# Patient Record
Sex: FEMALE | Race: BLACK OR AFRICAN AMERICAN | NOT HISPANIC OR LATINO | Employment: UNEMPLOYED | URBAN - METROPOLITAN AREA
[De-identification: names, ages, dates, MRNs, and addresses within clinical notes are randomized per-mention and may not be internally consistent; named-entity substitution may affect disease eponyms.]

---

## 2022-09-23 ENCOUNTER — HOSPITAL ENCOUNTER (OUTPATIENT)
Dept: GENERAL RADIOLOGY | Facility: HOSPITAL | Age: 52
Discharge: HOME | End: 2022-09-23
Admitting: NURSE PRACTITIONER

## 2022-09-23 ENCOUNTER — TRANSCRIBE ORDERS (OUTPATIENT)
Dept: ADMINISTRATIVE | Facility: HOSPITAL | Age: 52
End: 2022-09-23

## 2022-09-23 DIAGNOSIS — M79.604 RIGHT LEG PAIN: Primary | ICD-10-CM

## 2022-09-23 DIAGNOSIS — M79.604 RIGHT LEG PAIN: ICD-10-CM

## 2022-09-23 PROCEDURE — 72110 X-RAY EXAM L-2 SPINE 4/>VWS: CPT

## 2022-12-06 ENCOUNTER — TRANSCRIBE ORDERS (OUTPATIENT)
Dept: ADMINISTRATIVE | Facility: HOSPITAL | Age: 52
End: 2022-12-06

## 2022-12-06 DIAGNOSIS — M47.896 OTHER SPONDYLOSIS, LUMBAR REGION: Primary | ICD-10-CM

## 2023-01-03 ENCOUNTER — APPOINTMENT (OUTPATIENT)
Dept: MRI IMAGING | Facility: HOSPITAL | Age: 53
End: 2023-01-03

## 2023-01-06 ENCOUNTER — APPOINTMENT (OUTPATIENT)
Dept: MRI IMAGING | Facility: HOSPITAL | Age: 53
End: 2023-01-06
Payer: MEDICARE

## 2023-02-01 ENCOUNTER — HOSPITAL ENCOUNTER (OUTPATIENT)
Dept: MRI IMAGING | Facility: HOSPITAL | Age: 53
Discharge: HOME | End: 2023-02-01
Admitting: NURSE PRACTITIONER
Payer: MEDICARE

## 2023-02-01 DIAGNOSIS — M47.896 OTHER SPONDYLOSIS, LUMBAR REGION: ICD-10-CM

## 2023-02-01 PROCEDURE — 72148 MRI LUMBAR SPINE W/O DYE: CPT

## 2023-08-02 ENCOUNTER — APPOINTMENT (OUTPATIENT)
Dept: GENERAL RADIOLOGY | Facility: HOSPITAL | Age: 53
End: 2023-08-02
Payer: MEDICARE

## 2023-08-02 ENCOUNTER — HOSPITAL ENCOUNTER (EMERGENCY)
Facility: HOSPITAL | Age: 53
Discharge: HOME | End: 2023-08-02
Attending: EMERGENCY MEDICINE | Admitting: EMERGENCY MEDICINE
Payer: MEDICARE

## 2023-08-02 VITALS
HEART RATE: 79 BPM | TEMPERATURE: 98.7 F | DIASTOLIC BLOOD PRESSURE: 82 MMHG | BODY MASS INDEX: 39.19 KG/M2 | RESPIRATION RATE: 18 BRPM | WEIGHT: 235.23 LBS | SYSTOLIC BLOOD PRESSURE: 136 MMHG | OXYGEN SATURATION: 100 % | HEIGHT: 65 IN

## 2023-08-02 DIAGNOSIS — K21.9 GASTROESOPHAGEAL REFLUX DISEASE, UNSPECIFIED WHETHER ESOPHAGITIS PRESENT: ICD-10-CM

## 2023-08-02 DIAGNOSIS — R07.9 CHEST PAIN, UNSPECIFIED TYPE: Primary | ICD-10-CM

## 2023-08-02 LAB
ALBUMIN SERPL-MCNC: 3.7 G/DL (ref 3.5–5.2)
ALBUMIN/GLOB SERPL: 1.2 G/DL
ALP SERPL-CCNC: 75 U/L (ref 39–117)
ALT SERPL W P-5'-P-CCNC: 19 U/L (ref 1–33)
ANION GAP SERPL CALCULATED.3IONS-SCNC: 9 MMOL/L (ref 5–15)
AST SERPL-CCNC: 13 U/L (ref 1–32)
BASOPHILS # BLD AUTO: 0.1 10*3/MM3 (ref 0–0.2)
BASOPHILS NFR BLD AUTO: 1.1 % (ref 0–1.5)
BILIRUB SERPL-MCNC: 0.4 MG/DL (ref 0–1.2)
BUN SERPL-MCNC: 15 MG/DL (ref 6–20)
BUN/CREAT SERPL: 18.5 (ref 7–25)
CALCIUM SPEC-SCNC: 8.7 MG/DL (ref 8.6–10.5)
CHLORIDE SERPL-SCNC: 103 MMOL/L (ref 98–107)
CO2 SERPL-SCNC: 30 MMOL/L (ref 22–29)
CREAT SERPL-MCNC: 0.81 MG/DL (ref 0.57–1)
DEPRECATED RDW RBC AUTO: 38.5 FL (ref 37–54)
EGFRCR SERPLBLD CKD-EPI 2021: 86.9 ML/MIN/1.73
EOSINOPHIL # BLD AUTO: 0.1 10*3/MM3 (ref 0–0.4)
EOSINOPHIL NFR BLD AUTO: 2.5 % (ref 0.3–6.2)
ERYTHROCYTE [DISTWIDTH] IN BLOOD BY AUTOMATED COUNT: 15.9 % (ref 12.3–15.4)
GEN 5 2HR TROPONIN T REFLEX: <6 NG/L
GLOBULIN UR ELPH-MCNC: 3 GM/DL
GLUCOSE SERPL-MCNC: 101 MG/DL (ref 65–99)
HCT VFR BLD AUTO: 36.7 % (ref 34–46.6)
HGB BLD-MCNC: 11.7 G/DL (ref 12–15.9)
HOLD SPECIMEN: NORMAL
LIPASE SERPL-CCNC: 41 U/L (ref 13–60)
LYMPHOCYTES # BLD AUTO: 1.8 10*3/MM3 (ref 0.7–3.1)
LYMPHOCYTES NFR BLD AUTO: 37.8 % (ref 19.6–45.3)
MCH RBC QN AUTO: 22.1 PG (ref 26.6–33)
MCHC RBC AUTO-ENTMCNC: 31.8 G/DL (ref 31.5–35.7)
MCV RBC AUTO: 69.5 FL (ref 79–97)
MONOCYTES # BLD AUTO: 0.3 10*3/MM3 (ref 0.1–0.9)
MONOCYTES NFR BLD AUTO: 7.1 % (ref 5–12)
NEUTROPHILS NFR BLD AUTO: 2.5 10*3/MM3 (ref 1.7–7)
NEUTROPHILS NFR BLD AUTO: 51.5 % (ref 42.7–76)
NRBC BLD AUTO-RTO: 0.1 /100 WBC (ref 0–0.2)
NT-PROBNP SERPL-MCNC: <36 PG/ML (ref 0–900)
PLATELET # BLD AUTO: 334 10*3/MM3 (ref 140–450)
PMV BLD AUTO: 7.9 FL (ref 6–12)
POTASSIUM SERPL-SCNC: 3.5 MMOL/L (ref 3.5–5.2)
PROT SERPL-MCNC: 6.7 G/DL (ref 6–8.5)
RBC # BLD AUTO: 5.29 10*6/MM3 (ref 3.77–5.28)
SODIUM SERPL-SCNC: 142 MMOL/L (ref 136–145)
TROPONIN T DELTA: NORMAL
TROPONIN T SERPL HS-MCNC: <6 NG/L
WBC NRBC COR # BLD: 4.9 10*3/MM3 (ref 3.4–10.8)
WHOLE BLOOD HOLD SPECIMEN: NORMAL

## 2023-08-02 PROCEDURE — 84484 ASSAY OF TROPONIN QUANT: CPT | Performed by: PHYSICIAN ASSISTANT

## 2023-08-02 PROCEDURE — 71045 X-RAY EXAM CHEST 1 VIEW: CPT

## 2023-08-02 PROCEDURE — 85025 COMPLETE CBC W/AUTO DIFF WBC: CPT | Performed by: PHYSICIAN ASSISTANT

## 2023-08-02 PROCEDURE — 99284 EMERGENCY DEPT VISIT MOD MDM: CPT

## 2023-08-02 PROCEDURE — 80053 COMPREHEN METABOLIC PANEL: CPT | Performed by: PHYSICIAN ASSISTANT

## 2023-08-02 PROCEDURE — 83880 ASSAY OF NATRIURETIC PEPTIDE: CPT | Performed by: PHYSICIAN ASSISTANT

## 2023-08-02 PROCEDURE — 36415 COLL VENOUS BLD VENIPUNCTURE: CPT

## 2023-08-02 PROCEDURE — 93005 ELECTROCARDIOGRAM TRACING: CPT

## 2023-08-02 PROCEDURE — 83690 ASSAY OF LIPASE: CPT | Performed by: PHYSICIAN ASSISTANT

## 2023-08-02 RX ORDER — MECOBALAMIN 5000 MCG
30 TABLET,DISINTEGRATING ORAL DAILY
Qty: 60 CAPSULE | Refills: 0 | Status: SHIPPED | OUTPATIENT
Start: 2023-08-02 | End: 2023-09-01

## 2023-08-02 RX ORDER — SODIUM CHLORIDE 0.9 % (FLUSH) 0.9 %
10 SYRINGE (ML) INJECTION AS NEEDED
Status: DISCONTINUED | OUTPATIENT
Start: 2023-08-02 | End: 2023-08-02 | Stop reason: HOSPADM

## 2023-08-02 RX ORDER — ASPIRIN 81 MG/1
324 TABLET, CHEWABLE ORAL ONCE
Status: COMPLETED | OUTPATIENT
Start: 2023-08-02 | End: 2023-08-02

## 2023-08-02 RX ADMIN — ALUMINUM HYDROXIDE, MAGNESIUM HYDROXIDE, AND DIMETHICONE: 400; 400; 40 SUSPENSION ORAL at 16:52

## 2023-08-02 RX ADMIN — ASPIRIN 81 MG CHEWABLE TABLET 324 MG: 81 TABLET CHEWABLE at 15:51

## 2023-08-02 NOTE — DISCHARGE INSTRUCTIONS
Push clear liquid    See gastroenterology for further work-up if symptoms are persistent    Use Pepcid 20 mg twice daily-over-the-counter    See cardiologist if chest pain continues

## 2023-08-02 NOTE — Clinical Note
[facility] EMERGENCY DEPARTMENT  [address] [facility] [facility] [place] [address]  Phone: [phone]    Kimberly Dawson was seen and treated in our emergency department on 8/2/2023.  She may return to work on 08/04/2023.         Thank you for choosing [facility].    Narda Peters RN

## 2023-08-02 NOTE — ED PROVIDER NOTES
Subjective   History of Present Illness  Chief Complaint: Chest pain    Patient is a 52-year-old -American female with history of acid reflux presents to the ER with complaints of chest pain for 1 week.  Patient states the pain was intermittent and now became more constant.  She describes as a pressure in the middle of her chest that does not radiate.  She also reports some pain in her throat.  No shortness of breath cough or congestion.  No abdominal pain, nausea vomiting or diarrhea.  No lower extremity swelling.  No personal heart history.  Patient states that she was evaluated by virtual provider and started on azithromycin and prednisone previously for cough and feelings of congestion but she states her symptoms have not improved.    PCP: Titi Nance    History provided by:  Patient    Review of Systems   Constitutional:  Negative for chills and fever.   HENT:  Negative for sore throat and trouble swallowing.    Eyes: Negative.    Respiratory:  Negative for shortness of breath and wheezing.    Cardiovascular:  Positive for chest pain.   Gastrointestinal:  Negative for abdominal pain, diarrhea, nausea and vomiting.   Endocrine: Negative.    Genitourinary:  Negative for dysuria.   Skin:  Negative for rash.   Allergic/Immunologic: Negative.    Neurological:  Negative for headaches.   Psychiatric/Behavioral:  Negative for behavioral problems.    All other systems reviewed and are negative.    Past Medical History:   Diagnosis Date    Acid reflux     Injury of back     Neck complaint     PTSD (post-traumatic stress disorder)     from elevator falling incident       Allergies   Allergen Reactions    Morphine Nausea And Vomiting    Amoxicillin Other (See Comments)     Amoxicillin alone 'flares up acid reflux', but she is able to take/tolerate augmentin    Codeine Nausea Only       Past Surgical History:   Procedure Laterality Date    CHOLECYSTECTOMY      GASTRIC SLEEVE LAPAROSCOPIC         History reviewed.  No pertinent family history.    Social History     Socioeconomic History    Marital status: Single   Tobacco Use    Smoking status: Never    Smokeless tobacco: Never   Vaping Use    Vaping Use: Never used   Substance and Sexual Activity    Alcohol use: Never           Objective   Physical Exam  Vitals and nursing note reviewed.   Constitutional:       Appearance: Normal appearance. She is well-developed and normal weight. She is not ill-appearing or toxic-appearing.   HENT:      Head: Normocephalic and atraumatic.   Eyes:      Pupils: Pupils are equal, round, and reactive to light.   Cardiovascular:      Rate and Rhythm: Normal rate and regular rhythm.      Heart sounds: Normal heart sounds. No murmur heard.  Pulmonary:      Effort: Pulmonary effort is normal. No respiratory distress.      Breath sounds: Normal breath sounds. No decreased breath sounds or wheezing.   Chest:      Chest wall: No tenderness.   Abdominal:      General: Bowel sounds are normal. There is no distension.      Palpations: Abdomen is soft.      Tenderness: There is no abdominal tenderness.   Musculoskeletal:      Right lower leg: No edema.      Left lower leg: No edema.   Skin:     General: Skin is warm and dry.      Capillary Refill: Capillary refill takes less than 2 seconds.      Findings: No rash.   Neurological:      General: No focal deficit present.      Mental Status: She is alert and oriented to person, place, and time.   Psychiatric:         Mood and Affect: Mood normal.         Behavior: Behavior normal.       ECG 12 Lead      Date/Time: 8/2/2023 3:46 PM  Performed by: Judy Bahena PA  Authorized by: Jose Petre MD   Interpreted by physician  Previous ECG: no previous ECG available  Rhythm: sinus rhythm  Rate: normal  BPM: 77  QRS axis: normal  Conduction: conduction normal  Clinical impression: non-specific ECG             ED Course  ED Course as of 08/02/23 1844   Wed Aug 02, 2023   1716 Patient, reports that her pain has  "improved.  Offered observation admission for chest pain rule out, patient declined she states she prefers to follow-up outpatient. []   1843 Repeat troponin was found to be negative patient was discharged home with instructions to follow-up with gastroenterology or cardiology or primary care for further evaluation and treatment of symptoms if they are persistent [KW]      ED Course User Index  [KW] Deya Loredo, APRN  [MC] NadiaJudy, PA    /82   Pulse 79   Temp 98.7 øF (37.1 øC)   Resp 18   Ht 165.1 cm (65\")   Wt 107 kg (235 lb 3.7 oz)   SpO2 100%   BMI 39.14 kg/mý   Labs Reviewed   COMPREHENSIVE METABOLIC PANEL - Abnormal; Notable for the following components:       Result Value    Glucose 101 (*)     CO2 30.0 (*)     All other components within normal limits    Narrative:     GFR Normal >60  Chronic Kidney Disease <60  Kidney Failure <15     CBC WITH AUTO DIFFERENTIAL - Abnormal; Notable for the following components:    RBC 5.29 (*)     Hemoglobin 11.7 (*)     MCV 69.5 (*)     MCH 22.1 (*)     RDW 15.9 (*)     All other components within normal limits   TROPONIN - Normal    Narrative:     High Sensitive Troponin T Reference Range:  <10.0 ng/L- Negative Female for AMI  <15.0 ng/L- Negative Male for AMI  >=10 - Abnormal Female indicating possible myocardial injury.  >=15 - Abnormal Male indicating possible myocardial injury.   Clinicians would have to utilize clinical acumen, EKG, Troponin, and serial changes to determine if it is an Acute Myocardial Infarction or myocardial injury due to an underlying chronic condition.        BNP (IN-HOUSE) - Normal    Narrative:     Among patients with dyspnea, NT-proBNP is highly sensitive for the detection of acute congestive heart failure. In addition NT-proBNP of <300 pg/ml effectively rules out acute congestive heart failure with 99% negative predictive value.     LIPASE - Normal   RAINBOW DRAW    Narrative:     The following orders were created " for panel order [place] Draw.  Procedure                               Abnormality         Status                     ---------                               -----------         ------                     Green Top (Gel)[[id number]]                                                             Lavender Top[[id number]]                                     Final result               Gold Top - SST[[id number]]                                   Final result               Light Blue Top[[id number]]                                                                Please view results for these tests on the individual orders.   HIGH SENSITIVITIY TROPONIN T 2HR    Narrative:     High Sensitive Troponin T Reference Range:  <10.0 ng/L- Negative Female for AMI  <15.0 ng/L- Negative Male for AMI  >=10 - Abnormal Female indicating possible myocardial injury.  >=15 - Abnormal Male indicating possible myocardial injury.   Clinicians would have to utilize clinical acumen, EKG, Troponin, and serial changes to determine if it is an Acute Myocardial Infarction or myocardial injury due to an underlying chronic condition.        CBC AND DIFFERENTIAL    Narrative:     The following orders were created for panel order CBC & Differential.  Procedure                               Abnormality         Status                     ---------                               -----------         ------                     CBC Auto Differential[[id number]]        Abnormal            Final result               Scan Slide[[id number]]                                                                    Please view results for these tests on the individual orders.   LAVENDER TOP   GOLD TOP - SST     Medications   sodium chloride 0.9 % flush 10 mL (has no administration in time range)   aspirin chewable tablet 324 mg (324 mg Oral Given 8/2/23 7052)   GI Cocktail (aluminum-magnesium hydroxide-simethicone 400-40 MG/5ML suspension 30 ML and Lidocaine Viscous HCl 2 %  solution 15 ML) ( Oral Given 8/2/23 1652)     XR Chest 1 View    Result Date: 8/2/2023  Impression: No acute cardiopulmonary finding. Electronically Signed: Nathalie Durand  8/2/2023 4:20 PM EDT  Workstation ID: [id number]                     HEART Score: 2                      Medical Decision Making  Differential Dx (Includes but not limited to): Angina, STEMI, reflux, pancreatitis  Medical Records Reviewed: No pertinent records to review  Labs: On my interpretation, CBC no leukocytosis, CMP unremarkable.  Troponin less than 6, pending second troponin.  Lipase normal.  Imaging: On interpretation, chest x-ray shows no obvious pneumonia  Telemetry: EKG interpretation: Reviewed myself interpreted by ER attending, sinus rhythm rate of 77 with no previous to compare.  No ST changes.  Testing considered but not ordered: CT PE protocol, patient denies shortness of breath she is not tachypneic or tachycardic.  Nature of Complaint: Acute  Admission vs Discharge: Discharge  Discussion: While in the ED IV was placed and labs were obtained appropriate PPE was worn during exam and throughout all encounters with the patient.  Patient had the above evaluation.  Patient placed on continuous telemetry monitoring throughout ER stay.  Patient was given aspirin as well as GI cocktail.  She does report some improvement after GI cocktail.  Lab work is unremarkable.  No evidence of infectious process, initial troponin is negative.  Patient has not had recent cardiac work-up.  Recommended observation admission for chest pain rule out.  Patient does report improvement after GI cocktail, she states that she normally takes Dexilant for her severe GERD but states that her insurance stopped covering this and she has not been taking it for quite some time.  Patient states that she feels that her GERD is likely the source of her persistent chest discomfort especially since the discomfort seems to radiate up into her throat.  Again offered  observation admission but patient states prefers to follow-up outpatient.  I advised patient that should her pain persist, return or become worse she may return to the ER at any point should she change her mind regarding admission.  Patient verbalized understanding.    Patient care transferred to Kylee Loredo NP pending second troponin and expected discharge per patient's preference.     Problems Addressed:  Chest pain, unspecified type: complicated acute illness or injury  Gastroesophageal reflux disease, unspecified whether esophagitis present: complicated acute illness or injury    Amount and/or Complexity of Data Reviewed  Labs: ordered. Decision-making details documented in ED Course.  Radiology: ordered. Decision-making details documented in ED Course.  ECG/medicine tests: ordered and independent interpretation performed. Decision-making details documented in ED Course.    Risk  OTC drugs.  Prescription drug management.        Final diagnoses:   Chest pain, unspecified type   Gastroesophageal reflux disease, unspecified whether esophagitis present       ED Disposition  ED Disposition       ED Disposition   Discharge    Condition   Stable    Comment   --               Titi Nance MD  [address] [address]  [phone]    In 3 days  If symptoms worsen, As needed    Cheng Galvan MD  [address] [place] [address]  [phone]    Call in 3 days  for further cardiology work up    Antonieta Yin MD  [address] [facility] [place] [address]  [phone]    In 3 days  As needed, If symptoms worsen         Medication List        New Prescriptions      lansoprazole 15 MG capsule  Commonly known as: PREVACID  Take 2 capsules by mouth Daily for 30 days.               Where to Get Your Medications        These medications were sent to [facility] [id number] - [place], [place] - [address] AT [facility] - [phone] [place] [phone] FX  [address], [place] [address]      Phone: [phone]   lansoprazole 15 MG capsule            Deya Loredo, APRN  08/02/23 1110

## 2023-08-03 LAB — QT INTERVAL: 394 MS

## 2024-01-18 ENCOUNTER — HOSPITAL ENCOUNTER (EMERGENCY)
Facility: HOSPITAL | Age: 54
Discharge: HOME | End: 2024-01-18
Attending: EMERGENCY MEDICINE | Admitting: EMERGENCY MEDICINE
Payer: MEDICARE

## 2024-01-18 ENCOUNTER — APPOINTMENT (OUTPATIENT)
Dept: CT IMAGING | Facility: HOSPITAL | Age: 54
End: 2024-01-18
Payer: MEDICARE

## 2024-01-18 VITALS
TEMPERATURE: 97.7 F | HEIGHT: 66 IN | HEART RATE: 66 BPM | WEIGHT: 229.94 LBS | SYSTOLIC BLOOD PRESSURE: 137 MMHG | DIASTOLIC BLOOD PRESSURE: 90 MMHG | RESPIRATION RATE: 17 BRPM | BODY MASS INDEX: 36.95 KG/M2 | OXYGEN SATURATION: 97 %

## 2024-01-18 DIAGNOSIS — R10.9 FLANK PAIN: Primary | ICD-10-CM

## 2024-01-18 LAB
ALBUMIN SERPL-MCNC: 3.9 G/DL (ref 3.5–5.2)
ALBUMIN/GLOB SERPL: 1.3 G/DL
ALP SERPL-CCNC: 58 U/L (ref 39–117)
ALT SERPL W P-5'-P-CCNC: 13 U/L (ref 1–33)
ANION GAP SERPL CALCULATED.3IONS-SCNC: 8 MMOL/L (ref 5–15)
AST SERPL-CCNC: 16 U/L (ref 1–32)
BASOPHILS # BLD AUTO: 0 10*3/MM3 (ref 0–0.2)
BASOPHILS NFR BLD AUTO: 0.5 % (ref 0–1.5)
BILIRUB SERPL-MCNC: 0.7 MG/DL (ref 0–1.2)
BILIRUB UR QL STRIP: NEGATIVE
BUN SERPL-MCNC: 8 MG/DL (ref 6–20)
BUN/CREAT SERPL: 9.4 (ref 7–25)
CALCIUM SPEC-SCNC: 9.2 MG/DL (ref 8.6–10.5)
CHLORIDE SERPL-SCNC: 105 MMOL/L (ref 98–107)
CLARITY UR: CLEAR
CO2 SERPL-SCNC: 27 MMOL/L (ref 22–29)
COLOR UR: YELLOW
CREAT SERPL-MCNC: 0.85 MG/DL (ref 0.57–1)
DEPRECATED RDW RBC AUTO: 37.2 FL (ref 37–54)
EGFRCR SERPLBLD CKD-EPI 2021: 81.5 ML/MIN/1.73
EOSINOPHIL # BLD AUTO: 0.1 10*3/MM3 (ref 0–0.4)
EOSINOPHIL NFR BLD AUTO: 3.1 % (ref 0.3–6.2)
ERYTHROCYTE [DISTWIDTH] IN BLOOD BY AUTOMATED COUNT: 15.5 % (ref 12.3–15.4)
GLOBULIN UR ELPH-MCNC: 2.9 GM/DL
GLUCOSE SERPL-MCNC: 102 MG/DL (ref 65–99)
GLUCOSE UR STRIP-MCNC: NEGATIVE MG/DL
HCT VFR BLD AUTO: 36.6 % (ref 34–46.6)
HGB BLD-MCNC: 11.7 G/DL (ref 12–15.9)
HGB UR QL STRIP.AUTO: NEGATIVE
KETONES UR QL STRIP: NEGATIVE
LEUKOCYTE ESTERASE UR QL STRIP.AUTO: NEGATIVE
LIPASE SERPL-CCNC: 26 U/L (ref 13–60)
LYMPHOCYTES # BLD AUTO: 2 10*3/MM3 (ref 0.7–3.1)
LYMPHOCYTES NFR BLD AUTO: 48 % (ref 19.6–45.3)
MCH RBC QN AUTO: 21.9 PG (ref 26.6–33)
MCHC RBC AUTO-ENTMCNC: 32 G/DL (ref 31.5–35.7)
MCV RBC AUTO: 68.6 FL (ref 79–97)
MONOCYTES # BLD AUTO: 0.3 10*3/MM3 (ref 0.1–0.9)
MONOCYTES NFR BLD AUTO: 7.1 % (ref 5–12)
NEUTROPHILS NFR BLD AUTO: 1.7 10*3/MM3 (ref 1.7–7)
NEUTROPHILS NFR BLD AUTO: 41.3 % (ref 42.7–76)
NITRITE UR QL STRIP: NEGATIVE
NRBC BLD AUTO-RTO: 0.1 /100 WBC (ref 0–0.2)
PH UR STRIP.AUTO: 7.5 [PH] (ref 5–8)
PLATELET # BLD AUTO: 321 10*3/MM3 (ref 140–450)
PMV BLD AUTO: 7.9 FL (ref 6–12)
POTASSIUM SERPL-SCNC: 3.6 MMOL/L (ref 3.5–5.2)
PROT SERPL-MCNC: 6.8 G/DL (ref 6–8.5)
PROT UR QL STRIP: NEGATIVE
RBC # BLD AUTO: 5.33 10*6/MM3 (ref 3.77–5.28)
SODIUM SERPL-SCNC: 140 MMOL/L (ref 136–145)
SP GR UR STRIP: 1.01 (ref 1–1.03)
UROBILINOGEN UR QL STRIP: NORMAL
WBC NRBC COR # BLD AUTO: 4.1 10*3/MM3 (ref 3.4–10.8)

## 2024-01-18 PROCEDURE — 96374 THER/PROPH/DIAG INJ IV PUSH: CPT

## 2024-01-18 PROCEDURE — 81003 URINALYSIS AUTO W/O SCOPE: CPT | Performed by: EMERGENCY MEDICINE

## 2024-01-18 PROCEDURE — 25810000003 SODIUM CHLORIDE 0.9 % SOLUTION: Performed by: EMERGENCY MEDICINE

## 2024-01-18 PROCEDURE — 99284 EMERGENCY DEPT VISIT MOD MDM: CPT

## 2024-01-18 PROCEDURE — 83690 ASSAY OF LIPASE: CPT | Performed by: EMERGENCY MEDICINE

## 2024-01-18 PROCEDURE — 74176 CT ABD & PELVIS W/O CONTRAST: CPT

## 2024-01-18 PROCEDURE — 80053 COMPREHEN METABOLIC PANEL: CPT | Performed by: EMERGENCY MEDICINE

## 2024-01-18 PROCEDURE — 25010000002 KETOROLAC TROMETHAMINE PER 15 MG: Performed by: EMERGENCY MEDICINE

## 2024-01-18 PROCEDURE — 85025 COMPLETE CBC W/AUTO DIFF WBC: CPT | Performed by: EMERGENCY MEDICINE

## 2024-01-18 RX ORDER — KETOROLAC TROMETHAMINE 30 MG/ML
15 INJECTION, SOLUTION INTRAMUSCULAR; INTRAVENOUS ONCE
Status: COMPLETED | OUTPATIENT
Start: 2024-01-18 | End: 2024-01-18

## 2024-01-18 RX ORDER — SODIUM CHLORIDE 0.9 % (FLUSH) 0.9 %
10 SYRINGE (ML) INJECTION AS NEEDED
Status: DISCONTINUED | OUTPATIENT
Start: 2024-01-18 | End: 2024-01-18 | Stop reason: HOSPADM

## 2024-01-18 RX ADMIN — SODIUM CHLORIDE 500 ML: 9 INJECTION, SOLUTION INTRAVENOUS at 05:13

## 2024-01-18 RX ADMIN — KETOROLAC TROMETHAMINE 15 MG: 30 INJECTION, SOLUTION INTRAMUSCULAR; INTRAVENOUS at 05:00

## 2024-01-18 NOTE — ED NOTES
Pt has had bilateral flank pain that radiates to back and lower abd. Pt states she has a knot on left side. Pt states area is very tender.

## 2024-01-18 NOTE — ED PROVIDER NOTES
Subjective   History of Present Illness  53-year-old female who states she has had some pain in her right flank rating to her right lower abdomen over the last 1 week.  She reports no fevers or chills she has had some associated nausea and slight increase in urinary frequency.  She reports no trauma.  Secondary complaint is that of a knot that she has felt in her left flank for the last couple of months.  States that it tender at times and reports no trauma.  Review of Systems    Past Medical History:   Diagnosis Date    Acid reflux     Injury of back     Neck complaint     PTSD (post-traumatic stress disorder)     from elevator falling incident       Allergies   Allergen Reactions    Morphine Nausea And Vomiting    Amoxicillin Other (See Comments)     Amoxicillin alone 'flares up acid reflux', but she is able to take/tolerate augmentin    Codeine Nausea Only       Past Surgical History:   Procedure Laterality Date    CHOLECYSTECTOMY      GASTRIC SLEEVE LAPAROSCOPIC         No family history on file.    Social History     Socioeconomic History    Marital status: Single   Tobacco Use    Smoking status: Never    Smokeless tobacco: Never   Vaping Use    Vaping Use: Never used   Substance and Sexual Activity    Alcohol use: Never       Prior to Admission medications    Medication Sig Start Date End Date Taking? Authorizing Provider   albuterol (PROVENTIL) (2.5 MG/3ML) 0.083% nebulizer solution albuterol sulfate 2.5 mg/3 mL (0.083 %) solution for nebulization    ProviderRome MD   amphetamine-dextroamphetamine (ADDERALL) 10 MG tablet  2/15/23   Rome Prakash MD   chlorthalidone (HYGROTON) 25 MG tablet chlorthalidone 25 mg tablet 9/26/22   Rome Prakash MD   DULoxetine (CYMBALTA) 60 MG capsule duloxetine 60 mg capsule,delayed release    ProviderRome MD   EPINEPHrine (EPIPEN) 0.3 MG/0.3ML solution auto-injector injection 0.3 mg Daily.    Rome Prakash MD   estradiol (ESTRACE) 0.5 MG  "tablet estradiol 0.5 mg tablet 9/26/22   Rome Prakash MD   fluticasone (FLONASE) 50 MCG/ACT nasal spray fluticasone propionate 50 mcg/actuation nasal spray,suspension    Rome Prakash MD   gabapentin (NEURONTIN) 400 MG capsule Every 8 (Eight) Hours.    Rome Prakash MD   HYDROcodone-acetaminophen (NORCO)  MG per tablet  2/4/23   Rome Prakash MD   QUEtiapine (SEROquel) 50 MG tablet quetiapine 50 mg tablet    Rome Prakash MD   tiZANidine (Zanaflex) 4 MG tablet Every 12 (Twelve) Hours.    Rome Prakash MD     /90   Pulse 66   Temp 97.7 °F (36.5 °C) (Oral)   Resp 17   Ht 167.6 cm (66\")   Wt 104 kg (229 lb 15 oz)   SpO2 97%   BMI 37.11 kg/m²       Objective   Physical Exam  General: Well-developed well-appearing, no acute distress, alert and appropriate  Eyes:  sclera nonicteric  HEENT: Mucous membranes moist, no mucosal swelling  Neck: Supple, no nuchal rigidity,   Respirations: Respirations nonlabored, equal breath sounds bilaterally, clear lungs  Heart regular rate and rhythm, no murmurs rubs or gallops,   Abdomen soft nontender nondistended, no hepatosplenomegaly, no hernia, no mass, normal bowel sounds, right CVA tenderness; there is a superficial rubbery 3 cm nodule under the skin in the left flank region, there is no overlying erythema and no tenderness or fluctuance, consistency is that of a lipoma  Extremities no clubbing cyanosis or edema, calves are symmetric and nontender  Neuro cranial nerves grossly intact, no focal limb deficits  Psych oriented, pleasant affect  Skin no rash, brisk cap refill  Procedures           ED Course      Results for orders placed or performed during the hospital encounter of 01/18/24   Comprehensive Metabolic Panel    Specimen: Blood   Result Value Ref Range    Glucose 102 (H) 65 - 99 mg/dL    BUN 8 6 - 20 mg/dL    Creatinine 0.85 0.57 - 1.00 mg/dL    Sodium 140 136 - 145 mmol/L    Potassium 3.6 3.5 - 5.2 mmol/L "    Chloride 105 98 - 107 mmol/L    CO2 27.0 22.0 - 29.0 mmol/L    Calcium 9.2 8.6 - 10.5 mg/dL    Total Protein 6.8 6.0 - 8.5 g/dL    Albumin 3.9 3.5 - 5.2 g/dL    ALT (SGPT) 13 1 - 33 U/L    AST (SGOT) 16 1 - 32 U/L    Alkaline Phosphatase 58 39 - 117 U/L    Total Bilirubin 0.7 0.0 - 1.2 mg/dL    Globulin 2.9 gm/dL    A/G Ratio 1.3 g/dL    BUN/Creatinine Ratio 9.4 7.0 - 25.0    Anion Gap 8.0 5.0 - 15.0 mmol/L    eGFR 81.5 >60.0 mL/min/1.73   Lipase    Specimen: Blood   Result Value Ref Range    Lipase 26 13 - 60 U/L   Urinalysis With Culture If Indicated - Urine, Clean Catch    Specimen: Urine, Clean Catch   Result Value Ref Range    Color, UA Yellow Yellow, Straw    Appearance, UA Clear Clear    pH, UA 7.5 5.0 - 8.0    Specific Gravity, UA 1.010 1.005 - 1.030    Glucose, UA Negative Negative    Ketones, UA Negative Negative    Bilirubin, UA Negative Negative    Blood, UA Negative Negative    Protein, UA Negative Negative    Leuk Esterase, UA Negative Negative    Nitrite, UA Negative Negative    Urobilinogen, UA 1.0 E.U./dL 0.2 - 1.0 E.U./dL   CBC Auto Differential    Specimen: Blood   Result Value Ref Range    WBC 4.10 3.40 - 10.80 10*3/mm3    RBC 5.33 (H) 3.77 - 5.28 10*6/mm3    Hemoglobin 11.7 (L) 12.0 - 15.9 g/dL    Hematocrit 36.6 34.0 - 46.6 %    MCV 68.6 (L) 79.0 - 97.0 fL    MCH 21.9 (L) 26.6 - 33.0 pg    MCHC 32.0 31.5 - 35.7 g/dL    RDW 15.5 (H) 12.3 - 15.4 %    RDW-SD 37.2 37.0 - 54.0 fl    MPV 7.9 6.0 - 12.0 fL    Platelets 321 140 - 450 10*3/mm3    Neutrophil % 41.3 (L) 42.7 - 76.0 %    Lymphocyte % 48.0 (H) 19.6 - 45.3 %    Monocyte % 7.1 5.0 - 12.0 %    Eosinophil % 3.1 0.3 - 6.2 %    Basophil % 0.5 0.0 - 1.5 %    Neutrophils, Absolute 1.70 1.70 - 7.00 10*3/mm3    Lymphocytes, Absolute 2.00 0.70 - 3.10 10*3/mm3    Monocytes, Absolute 0.30 0.10 - 0.90 10*3/mm3    Eosinophils, Absolute 0.10 0.00 - 0.40 10*3/mm3    Basophils, Absolute 0.00 0.00 - 0.20 10*3/mm3    nRBC 0.1 0.0 - 0.2 /100 WBC     CT  Abdomen Pelvis Without Contrast    Result Date: 1/18/2024  Impression: No acute abdominal or pelvic abnormality Electronically Signed: Jose Lawson MD  1/18/2024 5:11 AM EST  Workstation ID: [id number]                                          Medical Decision Making  Patient presents with some right flank pain differential diagnose including obstructive uropathy, pyelonephritis, pancreatitis, appendicitis, ischemic bowel, cholecystitis      Patient is a benign abdominal examination with no signs of peritonitis or acute abdomen.  She was advised of findings.  She was ordered Toradol for discomfort.  She is resting comfortably on reexamination.  She is agreeable to the plan for outpatient follow-up with her primary care for further evaluation and was given warning signs for return.  Muscular origin of the discomfort is favored to be likely etiology.    Problems Addressed:  Flank pain: complicated acute illness or injury    Amount and/or Complexity of Data Reviewed  Labs: ordered. Decision-making details documented in ED Course.     Details: CBC shows borderline anemia, no leukocytosis, urinalysis negative, comprehensive metabolic panel lipase normal  Radiology: ordered and independent interpretation performed.     Details: My independent interpretation of CT abdomen image no bowel obstruction or obstructive uropathy    Risk  Prescription drug management.        Final diagnoses:   Flank pain       ED Disposition  ED Disposition       ED Disposition   Discharge    Condition   Stable    Comment   --               Brianda Harper, APRN  [address] [facility] [place] [address] [phone]    Schedule an appointment as soon as possible for a visit in 1 week           Medication List      No changes were made to your prescriptions during this visit.            Oscar Fernandez MD  01/18/24 3971

## 2024-01-18 NOTE — DISCHARGE INSTRUCTIONS
Rest, deep tissue massage, gentle stretching.  Follow-up with your doctor for reexam in 1 week.  Return for increased pain, fever, persistent vomiting or any other concerns

## 2024-04-24 ENCOUNTER — APPOINTMENT (OUTPATIENT)
Dept: CT IMAGING | Facility: HOSPITAL | Age: 54
End: 2024-04-24
Payer: MEDICARE

## 2024-04-24 ENCOUNTER — HOSPITAL ENCOUNTER (EMERGENCY)
Facility: HOSPITAL | Age: 54
Discharge: HOME | End: 2024-04-24
Attending: EMERGENCY MEDICINE
Payer: MEDICARE

## 2024-04-24 VITALS
RESPIRATION RATE: 16 BRPM | OXYGEN SATURATION: 98 % | BODY MASS INDEX: 38.12 KG/M2 | HEIGHT: 66 IN | TEMPERATURE: 97.9 F | DIASTOLIC BLOOD PRESSURE: 83 MMHG | SYSTOLIC BLOOD PRESSURE: 139 MMHG | HEART RATE: 66 BPM | WEIGHT: 237.22 LBS
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
## 2024-04-24 DIAGNOSIS — S09.90XA INJURY OF HEAD, INITIAL ENCOUNTER: ICD-10-CM

## 2024-04-24 DIAGNOSIS — S06.0X1A CONCUSSION WITH LOSS OF CONSCIOUSNESS OF 30 MINUTES OR LESS, INITIAL ENCOUNTER: ICD-10-CM

## 2024-04-24 LAB
ALBUMIN SERPL-MCNC: 3.9 G/DL (ref 3.5–5.2)
ALBUMIN/GLOB SERPL: 1.2 G/DL
ALP SERPL-CCNC: 65 U/L (ref 39–117)
ALT SERPL W P-5'-P-CCNC: 15 U/L (ref 1–33)
ANION GAP SERPL CALCULATED.3IONS-SCNC: 6 MMOL/L (ref 5–15)
AST SERPL-CCNC: 18 U/L (ref 1–32)
BASOPHILS # BLD AUTO: 0.01 10*3/MM3 (ref 0–0.2)
BASOPHILS NFR BLD AUTO: 0.3 % (ref 0–1.5)
BILIRUB SERPL-MCNC: 0.5 MG/DL (ref 0–1.2)
BUN SERPL-MCNC: 13 MG/DL (ref 6–20)
BUN/CREAT SERPL: 15.9 (ref 7–25)
CALCIUM SPEC-SCNC: 9.4 MG/DL (ref 8.6–10.5)
CHLORIDE SERPL-SCNC: 106 MMOL/L (ref 98–107)
CO2 SERPL-SCNC: 29 MMOL/L (ref 22–29)
CREAT SERPL-MCNC: 0.82 MG/DL (ref 0.57–1)
DEPRECATED RDW RBC AUTO: 38.2 FL (ref 37–54)
EGFRCR SERPLBLD CKD-EPI 2021: 85.1 ML/MIN/1.73
EOSINOPHIL # BLD AUTO: 0.09 10*3/MM3 (ref 0–0.4)
EOSINOPHIL NFR BLD AUTO: 2.8 % (ref 0.3–6.2)
ERYTHROCYTE [DISTWIDTH] IN BLOOD BY AUTOMATED COUNT: 15.1 % (ref 12.3–15.4)
GLOBULIN UR ELPH-MCNC: 3.3 GM/DL
GLUCOSE SERPL-MCNC: 66 MG/DL (ref 65–99)
HCT VFR BLD AUTO: 37 % (ref 34–46.6)
HGB BLD-MCNC: 11.2 G/DL (ref 12–15.9)
HOLD SPECIMEN: NORMAL
IMM GRANULOCYTES # BLD AUTO: 0.01 10*3/MM3 (ref 0–0.05)
IMM GRANULOCYTES NFR BLD AUTO: 0.3 % (ref 0–0.5)
LYMPHOCYTES # BLD AUTO: 1.41 10*3/MM3 (ref 0.7–3.1)
LYMPHOCYTES NFR BLD AUTO: 44.2 % (ref 19.6–45.3)
MAGNESIUM SERPL-MCNC: 2 MG/DL (ref 1.6–2.6)
MCH RBC QN AUTO: 21.6 PG (ref 26.6–33)
MCHC RBC AUTO-ENTMCNC: 30.3 G/DL (ref 31.5–35.7)
MCV RBC AUTO: 71.3 FL (ref 79–97)
MONOCYTES # BLD AUTO: 0.26 10*3/MM3 (ref 0.1–0.9)
MONOCYTES NFR BLD AUTO: 8.2 % (ref 5–12)
NEUTROPHILS NFR BLD AUTO: 1.41 10*3/MM3 (ref 1.7–7)
NEUTROPHILS NFR BLD AUTO: 44.2 % (ref 42.7–76)
NRBC BLD AUTO-RTO: 0 /100 WBC (ref 0–0.2)
PLATELET # BLD AUTO: 291 10*3/MM3 (ref 140–450)
PMV BLD AUTO: 9.6 FL (ref 6–12)
POTASSIUM SERPL-SCNC: 3.7 MMOL/L (ref 3.5–5.2)
PROT SERPL-MCNC: 7.2 G/DL (ref 6–8.5)
RBC # BLD AUTO: 5.19 10*6/MM3 (ref 3.77–5.28)
SODIUM SERPL-SCNC: 141 MMOL/L (ref 136–145)
TSH SERPL DL<=0.05 MIU/L-ACNC: 1.19 UIU/ML (ref 0.27–4.2)
WBC NRBC COR # BLD AUTO: 3.19 10*3/MM3 (ref 3.4–10.8)

## 2024-04-24 PROCEDURE — 25810000003 LACTATED RINGERS SOLUTION: Performed by: EMERGENCY MEDICINE

## 2024-04-24 PROCEDURE — 83735 ASSAY OF MAGNESIUM: CPT | Performed by: EMERGENCY MEDICINE

## 2024-04-24 PROCEDURE — 85025 COMPLETE CBC W/AUTO DIFF WBC: CPT | Performed by: EMERGENCY MEDICINE

## 2024-04-24 PROCEDURE — 93005 ELECTROCARDIOGRAM TRACING: CPT | Performed by: EMERGENCY MEDICINE

## 2024-04-24 PROCEDURE — 99284 EMERGENCY DEPT VISIT MOD MDM: CPT

## 2024-04-24 PROCEDURE — 96374 THER/PROPH/DIAG INJ IV PUSH: CPT

## 2024-04-24 PROCEDURE — 70450 CT HEAD/BRAIN W/O DYE: CPT

## 2024-04-24 PROCEDURE — 84443 ASSAY THYROID STIM HORMONE: CPT | Performed by: EMERGENCY MEDICINE

## 2024-04-24 PROCEDURE — 80053 COMPREHEN METABOLIC PANEL: CPT | Performed by: EMERGENCY MEDICINE

## 2024-04-24 PROCEDURE — 25010000002 ONDANSETRON PER 1 MG: Performed by: EMERGENCY MEDICINE

## 2024-04-24 RX ORDER — SODIUM CHLORIDE 0.9 % (FLUSH) 0.9 %
10 SYRINGE (ML) INJECTION AS NEEDED
Status: DISCONTINUED | OUTPATIENT
Start: 2024-04-24 | End: 2024-04-24 | Stop reason: HOSPADM

## 2024-04-24 RX ORDER — ONDANSETRON 2 MG/ML
4 INJECTION INTRAMUSCULAR; INTRAVENOUS ONCE
Status: COMPLETED | OUTPATIENT
Start: 2024-04-24 | End: 2024-04-24

## 2024-04-24 RX ADMIN — ONDANSETRON 4 MG: 2 INJECTION INTRAMUSCULAR; INTRAVENOUS at 10:46

## 2024-04-24 RX ADMIN — SODIUM CHLORIDE, POTASSIUM CHLORIDE, SODIUM LACTATE AND CALCIUM CHLORIDE 500 ML: 600; 310; 30; 20 INJECTION, SOLUTION INTRAVENOUS at 10:45

## 2024-04-24 NOTE — DISCHARGE INSTRUCTIONS
Return for increasing headache increasing dizziness vomiting cannot anything down altered mental status speech difficulty facial droop paralysis to one-sided the other unable to eat drink talk walk or function normally or any other new or worsening problems or concerns return immediately to the ER  Follow-up with your primary care provider on Monday as scheduled.  Rest through the weekend plenty of fluids

## 2024-04-24 NOTE — ED NOTES
Pt c/o syncopal episode yesterday.  Pt reports she hit her head and now has a HA, she sts she is hearing a funny noised in her ears and nausea.

## 2024-04-24 NOTE — ED PROVIDER NOTES
Subjective   History of Present Illness  Complaint head injury fall    History of present illness this is a 53-year-old female who states she was at work yesterday somehow she fell she had hit up against a wall she had some loss of consciousness.  She was found by coworker she complains of headache and dizziness and nausea and ringing in her ears.  She states she felt fine throughout the day she is not sure what caused her to fall or she passed out first or she just lost her balance and fell but she did have a brief loss of consciousness.  She denies any speech difficulty visual changes or paralysis she is otherwise been on the eat and drink and she can walk without difficulty.  She has had no chest pain neck arm jaw pain or shortness of breath no black or bloody stool no recent illness flus viruses or vaccinations or foreign travels leg pain or swelling.      Review of Systems   Constitutional:  Negative for chills and fever.   Respiratory:  Negative for chest tightness and shortness of breath.    Cardiovascular:  Negative for chest pain and palpitations.   Gastrointestinal:  Positive for nausea. Negative for abdominal pain, blood in stool and vomiting.   Genitourinary:  Negative for difficulty urinating and dysuria.   Musculoskeletal:  Positive for neck pain. Negative for back pain.   Skin:  Negative for rash.   Neurological:  Positive for light-headedness and headaches. Negative for facial asymmetry and speech difficulty.   Psychiatric/Behavioral:  Negative for confusion.        Past Medical History:   Diagnosis Date    Acid reflux     Injury of back     Neck complaint     PTSD (post-traumatic stress disorder)     from elevator falling incident       Allergies   Allergen Reactions    Morphine Nausea And Vomiting    Amoxicillin Other (See Comments)     Amoxicillin alone 'flares up acid reflux', but she is able to take/tolerate augmentin    Codeine Nausea Only       Past Surgical History:   Procedure Laterality  Date    CHOLECYSTECTOMY      GASTRIC SLEEVE LAPAROSCOPIC         No family history on file.    Social History     Socioeconomic History    Marital status: Single   Tobacco Use    Smoking status: Never    Smokeless tobacco: Never   Vaping Use    Vaping status: Never Used   Substance and Sexual Activity    Alcohol use: Never     Prior to Admission medications    Medication Sig Start Date End Date Taking? Authorizing Provider   albuterol (PROVENTIL) (2.5 MG/3ML) 0.083% nebulizer solution albuterol sulfate 2.5 mg/3 mL (0.083 %) solution for nebulization    Rome Prakash MD   amphetamine-dextroamphetamine (ADDERALL) 10 MG tablet  2/15/23   Rome Prakash MD   chlorthalidone (HYGROTON) 25 MG tablet chlorthalidone 25 mg tablet 9/26/22   Rome Prakash MD   DULoxetine (CYMBALTA) 60 MG capsule duloxetine 60 mg capsule,delayed release    Rome Prakash MD   EPINEPHrine (EPIPEN) 0.3 MG/0.3ML solution auto-injector injection 0.3 mg Daily.    Rome Prakash MD   estradiol (ESTRACE) 0.5 MG tablet estradiol 0.5 mg tablet 9/26/22   Rome Prakash MD   fluticasone (FLONASE) 50 MCG/ACT nasal spray fluticasone propionate 50 mcg/actuation nasal spray,suspension    Rome Prakash MD   gabapentin (NEURONTIN) 400 MG capsule Every 8 (Eight) Hours.    Rome Prakash MD   HYDROcodone-acetaminophen (NORCO)  MG per tablet  2/4/23   Rome Prakash MD   QUEtiapine (SEROquel) 50 MG tablet quetiapine 50 mg tablet    Rome Prakash MD   tiZANidine (Zanaflex) 4 MG tablet Every 12 (Twelve) Hours.    ProviderRome MD          Objective   Physical Exam  Constitutional this is a 53-year-old female awake alert no acute distress resting comfortably.  Triage vital signs reviewed.  HEENT extraocular muscles are intact pupils equal round react there is no photophobia no raccoon or Vang sign no papilledema TMs are clear mouth is clear no drainage from ears or nose.  Back no  direct cervical thoracic lumbar spine tenderness noted trachea midline no JVD no bruits lungs clear no retraction no use of accessories.  Heart is regular without murmur abdomen soft nontender good bowel sounds no peritoneal findings or pulsatile masses extremities pulses equal throughout upper and lower extremities no edema no cords no Homans' sign no evidence of DVT skin warm and dry without rashes or cellulitic changes neurologic awake alert and orientated x 4 no Paci symmetry speech normal no drift the arms or legs normal finger-to-nose toes downgoing no clonus sits up without difficulty no truncal ataxia [place] Coma Scale 15  Procedures           ED Course      Results for orders placed or performed during the hospital encounter of 04/24/24   Comprehensive Metabolic Panel    Specimen: Blood   Result Value Ref Range    Glucose 66 65 - 99 mg/dL    BUN 13 6 - 20 mg/dL    Creatinine 0.82 0.57 - 1.00 mg/dL    Sodium 141 136 - 145 mmol/L    Potassium 3.7 3.5 - 5.2 mmol/L    Chloride 106 98 - 107 mmol/L    CO2 29.0 22.0 - 29.0 mmol/L    Calcium 9.4 8.6 - 10.5 mg/dL    Total Protein 7.2 6.0 - 8.5 g/dL    Albumin 3.9 3.5 - 5.2 g/dL    ALT (SGPT) 15 1 - 33 U/L    AST (SGOT) 18 1 - 32 U/L    Alkaline Phosphatase 65 39 - 117 U/L    Total Bilirubin 0.5 0.0 - 1.2 mg/dL    Globulin 3.3 gm/dL    A/G Ratio 1.2 g/dL    BUN/Creatinine Ratio 15.9 7.0 - 25.0    Anion Gap 6.0 5.0 - 15.0 mmol/L    eGFR 85.1 >60.0 mL/min/1.73   TSH    Specimen: Blood   Result Value Ref Range    TSH 1.190 0.270 - 4.200 uIU/mL   Magnesium    Specimen: Blood   Result Value Ref Range    Magnesium 2.0 1.6 - 2.6 mg/dL   CBC Auto Differential    Specimen: Blood   Result Value Ref Range    WBC 3.19 (L) 3.40 - 10.80 10*3/mm3    RBC 5.19 3.77 - 5.28 10*6/mm3    Hemoglobin 11.2 (L) 12.0 - 15.9 g/dL    Hematocrit 37.0 34.0 - 46.6 %    MCV 71.3 (L) 79.0 - 97.0 fL    MCH 21.6 (L) 26.6 - 33.0 pg    MCHC 30.3 (L) 31.5 - 35.7 g/dL    RDW 15.1 12.3 - 15.4 %    RDW-SD  38.2 37.0 - 54.0 fl    MPV 9.6 6.0 - 12.0 fL    Platelets 291 140 - 450 10*3/mm3    Neutrophil % 44.2 42.7 - 76.0 %    Lymphocyte % 44.2 19.6 - 45.3 %    Monocyte % 8.2 5.0 - 12.0 %    Eosinophil % 2.8 0.3 - 6.2 %    Basophil % 0.3 0.0 - 1.5 %    Immature Grans % 0.3 0.0 - 0.5 %    Neutrophils, Absolute 1.41 (L) 1.70 - 7.00 10*3/mm3    Lymphocytes, Absolute 1.41 0.70 - 3.10 10*3/mm3    Monocytes, Absolute 0.26 0.10 - 0.90 10*3/mm3    Eosinophils, Absolute 0.09 0.00 - 0.40 10*3/mm3    Basophils, Absolute 0.01 0.00 - 0.20 10*3/mm3    Immature Grans, Absolute 0.01 0.00 - 0.05 10*3/mm3    nRBC 0.0 0.0 - 0.2 /100 WBC   ECG 12 Lead Other; Dizzy   Result Value Ref Range    QT Interval 385 ms    QTC Interval 411 ms   Gold Top - SST   Result Value Ref Range    Extra Tube Hold for add-ons.      CT Head Without Contrast    Result Date: 4/24/2024  Impression: No acute intracranial abnormality Electronically Signed: Hill Larson MD  4/24/2024 11:14 AM EDT  Workstation ID: [id number]   Medications   lactated ringers bolus 500 mL (0 mL Intravenous Stopped 4/24/24 1227)   ondansetron (ZOFRAN) injection 4 mg (4 mg Intravenous Given 4/24/24 1046)                                    EKG my interpretation normal sinus rhythm rate of 69 normal axis no hypertrophy QTc 411 normal EKG unchanged from 8/2/2023          Medical Decision Making  Medical decision making.  IV established monitor placed my review of sinus rhythm 500 cc lactated ringer bolus Zofran 4 mg IV EKG my interpretation normal sinus rhythm rate 69 normal axis no hypertrophy was a normal EKG it is unchanged when compared to the previous 1 on 8/2/2023.  Labs obtained all independent reviewed by me comprehensive metabolic profile unremarkable TSH normal magnesium normal CBC unremarkable other than a white count 3.19 hemoglobin 11.2 platelets are normal.  CT head without my interpretation reveals no evidence of hemorrhage tumors or masses or acute abnormalities radiology  unremarkable.  Patient repeat exam resting comfortably.  She is up she is walk around the ER without difficulty there is no ataxia she has a [place] Coma Scale 15.  I do not see any evidence of an intracerebral hemorrhage I do not see any evidence of any type of stroke meningitis encephalitis acute cardiac ischemia DVT pulmonary embolism air dissection sepsis bacteremia she has not had any change in rhythm although not a complete list of all possibilities.  The etiology of her fall is not quite known.  But she is currently stable at this point.  I think overall low risk to be able to be discharged home and follow-up in the office she has an appointment on Monday.  We talked about head injury precautions what to return for she voiced understanding and she was discharged home for outpatient management and follow-up    Problems Addressed:  Concussion with loss of consciousness of 30 minutes or less, initial encounter: complicated acute illness or injury  Fall, initial encounter: complicated acute illness or injury  Injury of head, initial encounter: complicated acute illness or injury    Amount and/or Complexity of Data Reviewed  External Data Reviewed: ECG.  Labs: ordered. Decision-making details documented in ED Course.  Radiology: ordered and independent interpretation performed. Decision-making details documented in ED Course.  ECG/medicine tests: ordered and independent interpretation performed. Decision-making details documented in ED Course.        Final diagnoses:   Fall, initial encounter   Injury of head, initial encounter   Concussion with loss of consciousness of 30 minutes or less, initial encounter       ED Disposition  ED Disposition       ED Disposition   Discharge    Condition   Stable    Comment   --               Brianda Harper, APRN  [address]  [place] [address]  [phone]    In 1 week  Follow-up on Monday as scheduled         Medication List      No changes were made to your  prescriptions during this visit.            Malik Matthew MD  04/25/24 0704

## 2024-04-24 NOTE — Clinical Note
[facility] EMERGENCY DEPARTMENT  [address] [place] [address]  Phone: [phone]    Kimberly Dawson was seen and treated in our emergency department on 4/24/2024.  She may return to work on 04/27/2024.         Thank you for choosing [facility].    Malik Matthew MD

## 2024-04-25 LAB
QT INTERVAL: 385 MS
QTC INTERVAL: 411 MS

## 2024-05-29 ENCOUNTER — HOSPITAL ENCOUNTER (EMERGENCY)
Facility: HOSPITAL | Age: 54
Discharge: HOME | End: 2024-05-30
Attending: EMERGENCY MEDICINE
Payer: MEDICARE

## 2024-05-29 DIAGNOSIS — R51.9 NONINTRACTABLE HEADACHE, UNSPECIFIED CHRONICITY PATTERN, UNSPECIFIED HEADACHE TYPE: ICD-10-CM

## 2024-05-29 DIAGNOSIS — H93.13 TINNITUS OF BOTH EARS: Primary | ICD-10-CM

## 2024-05-29 PROCEDURE — 99283 EMERGENCY DEPT VISIT LOW MDM: CPT

## 2024-05-29 NOTE — Clinical Note
[facility] EMERGENCY DEPARTMENT  [address] [place] [address]  Phone: [phone]    Kimberly Dawson was seen and treated in our emergency department on 5/29/2024.  She may return to work on 06/02/2024.         Thank you for choosing [facility].    Nicolasa Solares APRN

## 2024-05-30 ENCOUNTER — TELEPHONE (OUTPATIENT)
Dept: NEUROLOGY | Facility: CLINIC | Age: 54
End: 2024-05-30
Payer: MEDICARE

## 2024-05-30 VITALS
HEIGHT: 66 IN | WEIGHT: 228.18 LBS | BODY MASS INDEX: 36.67 KG/M2 | SYSTOLIC BLOOD PRESSURE: 148 MMHG | DIASTOLIC BLOOD PRESSURE: 84 MMHG | TEMPERATURE: 98.8 F | HEART RATE: 68 BPM | OXYGEN SATURATION: 95 % | RESPIRATION RATE: 16 BRPM

## 2024-05-30 PROCEDURE — 25010000002 KETOROLAC TROMETHAMINE PER 15 MG: Performed by: NURSE PRACTITIONER

## 2024-05-30 PROCEDURE — 96375 TX/PRO/DX INJ NEW DRUG ADDON: CPT

## 2024-05-30 PROCEDURE — 25010000002 DIPHENHYDRAMINE PER 50 MG: Performed by: NURSE PRACTITIONER

## 2024-05-30 PROCEDURE — 96374 THER/PROPH/DIAG INJ IV PUSH: CPT

## 2024-05-30 PROCEDURE — 25010000002 METOCLOPRAMIDE PER 10 MG: Performed by: NURSE PRACTITIONER

## 2024-05-30 RX ORDER — DIPHENHYDRAMINE HYDROCHLORIDE 50 MG/ML
12.5 INJECTION INTRAMUSCULAR; INTRAVENOUS ONCE
Status: COMPLETED | OUTPATIENT
Start: 2024-05-30 | End: 2024-05-30

## 2024-05-30 RX ORDER — SODIUM CHLORIDE 0.9 % (FLUSH) 0.9 %
10 SYRINGE (ML) INJECTION AS NEEDED
Status: DISCONTINUED | OUTPATIENT
Start: 2024-05-30 | End: 2024-05-30 | Stop reason: HOSPADM

## 2024-05-30 RX ORDER — KETOROLAC TROMETHAMINE 30 MG/ML
15 INJECTION, SOLUTION INTRAMUSCULAR; INTRAVENOUS ONCE
Status: COMPLETED | OUTPATIENT
Start: 2024-05-30 | End: 2024-05-30

## 2024-05-30 RX ORDER — METOCLOPRAMIDE HYDROCHLORIDE 5 MG/ML
10 INJECTION INTRAMUSCULAR; INTRAVENOUS ONCE
Status: COMPLETED | OUTPATIENT
Start: 2024-05-30 | End: 2024-05-30

## 2024-05-30 RX ADMIN — METOCLOPRAMIDE 10 MG: 5 INJECTION, SOLUTION INTRAMUSCULAR; INTRAVENOUS at 00:34

## 2024-05-30 RX ADMIN — DIPHENHYDRAMINE HYDROCHLORIDE 12.5 MG: 50 INJECTION, SOLUTION INTRAMUSCULAR; INTRAVENOUS at 00:34

## 2024-05-30 RX ADMIN — KETOROLAC TROMETHAMINE 15 MG: 30 INJECTION, SOLUTION INTRAMUSCULAR at 00:34

## 2024-05-30 NOTE — ED PROVIDER NOTES
Subjective   Chief Complaint   Patient presents with    Tinnitus     Brianda Harper APRN    History of Present Illness  Patient is a 53-year-old female presents the ED with complaint of tinnitus since having a head injury in April.  She reports pain consistent, worse in the left ear than the right.  She reports that she has had a headache since that time as well.  She has been seen by occupational medicine, and is awaiting referral to ENT.  Review of Systems   HENT:  Positive for tinnitus.    Eyes:  Negative for photophobia and visual disturbance.   Musculoskeletal:  Negative for back pain, neck pain and neck stiffness.   Skin:  Negative for color change and rash.   Neurological:  Positive for headaches. Negative for dizziness, syncope and light-headedness.       Past Medical History:   Diagnosis Date    Acid reflux     Injury of back     Neck complaint     PTSD (post-traumatic stress disorder)     from elevator falling incident       Allergies   Allergen Reactions    Morphine Nausea And Vomiting    Amoxicillin Other (See Comments)     Amoxicillin alone 'flares up acid reflux', but she is able to take/tolerate augmentin    Codeine Nausea Only       Past Surgical History:   Procedure Laterality Date    CHOLECYSTECTOMY      GASTRIC SLEEVE LAPAROSCOPIC         History reviewed. No pertinent family history.    Social History     Socioeconomic History    Marital status: Single   Tobacco Use    Smoking status: Never    Smokeless tobacco: Never   Vaping Use    Vaping status: Never Used   Substance and Sexual Activity    Alcohol use: Never           Objective   Physical Exam  Vitals and nursing note reviewed.   Constitutional:       Appearance: Normal appearance. She is not toxic-appearing.   HENT:      Head: Normocephalic and atraumatic.      Right Ear: Tympanic membrane, ear canal and external ear normal.      Left Ear: Tympanic membrane, ear canal and external ear normal.      Nose: Nose normal.      Mouth/Throat:       "Mouth: Mucous membranes are moist.      Pharynx: Oropharynx is clear.   Eyes:      Extraocular Movements: Extraocular movements intact.      Conjunctiva/sclera: Conjunctivae normal.      Pupils: Pupils are equal, round, and reactive to light.   Cardiovascular:      Rate and Rhythm: Normal rate and regular rhythm.      Heart sounds: Normal heart sounds. No murmur heard.     No friction rub. No gallop.   Pulmonary:      Effort: Pulmonary effort is normal.      Breath sounds: Normal breath sounds.   Abdominal:      General: Bowel sounds are normal.      Palpations: Abdomen is soft.   Musculoskeletal:         General: Normal range of motion.      Cervical back: Normal range of motion and neck supple.   Skin:     General: Skin is warm and dry.      Capillary Refill: Capillary refill takes less than 2 seconds.   Neurological:      General: No focal deficit present.      Mental Status: She is alert and oriented to person, place, and time.      Cranial Nerves: No dysarthria or facial asymmetry.      Sensory: Sensation is intact.      Motor: Motor function is intact. No weakness.      Coordination: Coordination is intact.         Procedures           ED Course      /84   Pulse 68   Temp 98.8 °F (37.1 °C)   Resp 16   Ht 166.4 cm (65.5\")   Wt 104 kg (228 lb 2.8 oz)   SpO2 95%   BMI 37.39 kg/m²   Medications   sodium chloride 0.9 % flush 10 mL (has no administration in time range)   ketorolac (TORADOL) injection 15 mg (15 mg Intravenous Given 5/30/24 0034)   diphenhydrAMINE (BENADRYL) injection 12.5 mg (12.5 mg Intravenous Given 5/30/24 0034)   metoclopramide (REGLAN) injection 10 mg (10 mg Intravenous Given 5/30/24 0034)     No radiology results for the last day  Lab Results (last 24 hours)       ** No results found for the last 24 hours. **                                                 Medical Decision Making  Problems Addressed:  Nonintractable headache, unspecified chronicity pattern, unspecified headache " type: complicated acute illness or injury  Tinnitus of both ears: complicated acute illness or injury    Risk  Prescription drug management.    Chart Review: 4/23/2024 [facility] ED visit for fall  Imaging: No radiology results for the last day    Pt was Placed on appropriate monitoring.  Differential diagnoses considered for patient presentation, this list is not all inclusive of diagnoses considered: Postconcussive syndrome, migraine.  Patient given medication for headache and nausea.  She reports that this feel better.  She has no focal neurological findings.  Headache has been recurrent since she had a head injury 1 month ago.  She is been seeing occupational medicine.  I have given information to follow-up with the ENT for her tinnitus, as well is neuro for headaches.  Patient presents to the ED for the above complaint, underwent the above, exam and workup.   Disposition: I discussed my findings, plan of care, discharge instructions, the importance of follow up with their PCP/ and or specialist for repeat evaluation and to discuss any abnormal findings in labs or imaging that warrant further outpatient evaluation. We discussed that although a definitive diagnosis is not always found in the ED, it is believed emergent conditions have been ruled out, and patient is safe for discharge at this time.  We discussed return precautions for the emergency department.  Patient verbalizes understandings, and agrees with current plan of care.  Note Disclaimer: At [facility], we believe that sharing information builds trust and better relationships. You are receiving this note because you recently visited [facility]. It is possible you will see health information before a provider has talked with you about it. This kind of information can be easy to misunderstand. To help you fully understand what it means for your health, we urge you to discuss this note with your provider  Note dictated utilizing Kelly  Dictation.  Appropriate PPE worn during patient interactions.      Final diagnoses:   Tinnitus of both ears   Nonintractable headache, unspecified chronicity pattern, unspecified headache type       ED Disposition  ED Disposition       ED Disposition   Discharge    Condition   Stable    Comment   --               Brianda Harper, APRN  [address] [place] [address]  [phone]          [facility] EMERGENCY DEPARTMENT  [facility] [facility] [id number] [phone]        [facility]  [address]  [facility] [address]  [phone]        Seipel, Joseph F, MD  [facility] [facility] [place] [address]  [phone]               Medication List      No changes were made to your prescriptions during this visit.            Nicolasa Solares, APRN  05/30/24 0123       Nicolasa Solares, APRFRANDY  05/30/24 0131

## 2024-05-30 NOTE — TELEPHONE ENCOUNTER
Caller: Kimberly Dawson    Relationship to patient: Self    Best call back number: [phone]    New or established patient?  [x] New  [] Established    Date of discharge: 5/29/24    Facility discharged from:     Diagnosis/Symptoms: NONINTRACTABLE HA     Length of stay (If applicable): 1 DAY    Specialty Only: Did you see a [facility] provider?    [] Yes  [x] No  If so, who? N/A    PATIENT IS REQUESTING HOSP F/U FOR DX:NONINTRACTABLE HA W/ SEIPEL (NO TIMEFRAME SPECIFIED).    OK TO SCHED? PLEASE REVIEW & ADVISE-THANK YOU

## 2024-06-06 NOTE — PROGRESS NOTES
"Chief Complaint  Hospital F/U Headaches and Tinnitus    Subjective            Kimberly Dawson presents to [facility] for hospital follow up for headaches and tinnitus.  History of Present Illness    Kimberly Dawson is a 53-year-old female seen today for post concussive headache and dizziness.  The patient fell on 4/24/24 at work.  She states that she lost consciousness and when she \"came to\", her right side of her head and neck were leaning up against a wall.  She went to [facility] and no testing was completed.  The following day she went to [facility] for an evaluation.    Since this fall, she has been off balance and dizzy, she feels irritable and feels \"bad\" all the time, and fatigued.  She has a constant headache which feels like a \"vice\" with pressure and squeezing around her head.  Activity makes symptoms worse.  When her headache worsens she gets more dizzy, nauseated, and vomits.  Patient has constant light sensitivity.  She states that lying down helps headache slightly.  Immediately following the fall the patient had tinnitus and hearing loss on the right.  The hearing has improved but she continues to have tinnitus.  Patient takes Norco every 2 hours for a previous injury and takes Tylenol arthritis rarely.    Patient admits to having history of migraines in the past.  These significantly improved and she has not had many since 2016 when she had a hysterectomy.    Patient also notes having staring spells where she is unable to respond to family or friends.  This has been present since her loss of consciousness/head trauma.  She asks about she seizure activity.       ---------------------------------------------------------------------------------------------------------------------------------------------------------------------------------  05/29/2024 ED Note  History of Present Illness  Patient is a 53-year-old female presents the ED with complaint of tinnitus since " having a head injury in April.  She reports pain consistent, worse in the left ear than the right.  She reports that she has had a headache since that time as well.  She has been seen by occupational medicine, and is awaiting referral to ENT.  Review of Systems   HENT:  Positive for tinnitus.    Eyes:  Negative for photophobia and visual disturbance.   Musculoskeletal:  Negative for back pain, neck pain and neck stiffness.   Skin:  Negative for color change and rash.   Neurological:  Positive for headaches. Negative for dizziness, syncope and light-headedness.     Medical Decision Making  Problems Addressed:  Nonintractable headache, unspecified chronicity pattern, unspecified headache type: complicated acute illness or injury  Tinnitus of both ears: complicated acute illness or injury    Chart Review: 4/23/2024 [facility] ED visit for fall  Imaging: No radiology results for the last day  Pt was Placed on appropriate monitoring.  Differential diagnoses considered for patient presentation, this list is not all inclusive of diagnoses considered: Postconcussive syndrome, migraine.  Patient given medication for headache and nausea.  She reports that this feel better.  She has no focal neurological findings.  Headache has been recurrent since she had a head injury 1 month ago.  She is been seeing occupational medicine.  I have given information to follow-up with the ENT for her tinnitus, as well is neuro for headaches.  Patient presents to the ED for the above complaint, underwent the above, exam and workup.   Disposition: I discussed my findings, plan of care, discharge instructions, the importance of follow up with their PCP/ and or specialist for repeat evaluation and to discuss any abnormal findings in labs or imaging that warrant further outpatient evaluation. We discussed that although a definitive diagnosis is not always found in the ED, it is believed emergent conditions have been ruled out, and patient is  safe for discharge at this time.  We discussed return precautions for the emergency department.  Patient verbalizes understandings, and agrees with current plan of care.  Note Disclaimer: At [facility], we believe that sharing information builds trust and better relationships. You are receiving this note because you recently visited [facility]. It is possible you will see health information before a provider has talked with you about it. This kind of information can be easy to misunderstand. To help you fully understand what it means for your health, we urge you to discuss this note with your provider  Note dictated utilizing Dragon Dictation.  Appropriate PPE worn during patient interactions.          History reviewed. No pertinent family history.    Past Medical History:   Diagnosis Date    Acid reflux     Injury of back     Migraine June 1998    Neck complaint     Peripheral neuropathy May 2022    PTSD (post-traumatic stress disorder)     from elevator falling incident       Social History     Socioeconomic History    Marital status: Single   Tobacco Use    Smoking status: Never    Smokeless tobacco: Never   Vaping Use    Vaping status: Never Used   Substance and Sexual Activity    Alcohol use: Never    Drug use: Never    Sexual activity: Not Currently     Partners: Male     Comment: Hysterectomy         Current Outpatient Medications:     albuterol (PROVENTIL) (2.5 MG/3ML) 0.083% nebulizer solution, albuterol sulfate 2.5 mg/3 mL (0.083 %) solution for nebulization, Disp: , Rfl:     amphetamine-dextroamphetamine (ADDERALL) 10 MG tablet, , Disp: , Rfl:     chlorthalidone (HYGROTON) 25 MG tablet, chlorthalidone 25 mg tablet, Disp: , Rfl:     DULoxetine (CYMBALTA) 60 MG capsule, 30 mg, Disp: , Rfl:     EPINEPHrine (EPIPEN) 0.3 MG/0.3ML solution auto-injector injection, 0.3 mL Daily., Disp: , Rfl:     HYDROcodone-acetaminophen (NORCO)  MG per tablet, , Disp: , Rfl:     tiZANidine (Zanaflex) 4 MG tablet,  "Every 12 (Twelve) Hours., Disp: , Rfl:     topiramate (Topamax) 25 MG tablet, Take 1 tablet by mouth every night at bedtime for 7 days, THEN 1 tablet 2 (Two) Times a Day for 7 days, THEN 2 tablets 2 (Two) Times a Day for 30 days., Disp: 60 tablet, Rfl: 2    Review of Systems   Constitutional:  Positive for activity change, appetite change and fatigue.   HENT:  Positive for hearing loss and tinnitus.    Gastrointestinal:  Positive for nausea.   Neurological:  Positive for dizziness, light-headedness and headaches.   Psychiatric/Behavioral:  Positive for agitation, hallucinations and sleep disturbance. The patient is nervous/anxious.    All other systems reviewed and are negative.           Objective   Vital Signs:   /84   Pulse 90   Ht 166.4 cm (65.5\")   Wt 103 kg (228 lb)   BMI 37.36 kg/m²     Physical Exam  Vitals reviewed.   Constitutional:       Appearance: She is well-developed. She is ill-appearing.   HENT:      Head: Normocephalic and atraumatic.   Eyes:      Extraocular Movements: Extraocular movements intact and EOM normal.      Pupils: Pupils are equal, round, and reactive to light.   Pulmonary:      Effort: Pulmonary effort is normal.   Musculoskeletal:      Cervical back: Normal range of motion and neck supple.   Skin:     General: Skin is warm and dry.      Findings: No rash.   Neurological:      Mental Status: She is alert and oriented to person, place, and time.      Cranial Nerves: No cranial nerve deficit.      Sensory: Sensory deficit present.      Motor: Motor function is intact. No weakness, tremor, atrophy or abnormal muscle tone.      Coordination: Romberg sign positive. Coordination abnormal. Finger-Nose-Finger Test normal.      Gait: Gait abnormal.      Deep Tendon Reflexes:      Reflex Scores:       Tricep reflexes are 2+ on the right side and 2+ on the left side.       Bicep reflexes are 2+ on the right side and 2+ on the left side.       Brachioradialis reflexes are 2+ on the " right side and 2+ on the left side.       Patellar reflexes are 2+ on the right side and 2+ on the left side.       Achilles reflexes are 2+ on the right side and 2+ on the left side.     Comments: Patient ambulating with a cane.  Patient had swaying with eyes open so Romberg and tandem gait not tested due to safety concerns   Psychiatric:         Attention and Perception: Attention normal.         Mood and Affect: Affect is flat.         Speech: Speech normal.         Behavior: Behavior normal.         Cognition and Memory: Cognition and memory normal.         Judgment: Judgment normal.      Comments: Patient appears ill feeling, flat affect      Result Review :     CMP          8/2/2023    15:53 1/18/2024    04:16 4/24/2024    10:41   CMP   Glucose 101  102  66    BUN 15  8  13    Creatinine 0.81  0.85  0.82    EGFR 86.9  81.5  85.1    Sodium 142  140  141    Potassium 3.5  3.6  3.7    Chloride 103  105  106    Calcium 8.7  9.2  9.4    Total Protein 6.7  6.8  7.2    Albumin 3.7  3.9  3.9    Globulin 3.0  2.9  3.3    Total Bilirubin 0.4  0.7  0.5    Alkaline Phosphatase 75  58  65    AST (SGOT) 13  16  18    ALT (SGPT) 19  13  15    Albumin/Globulin Ratio 1.2  1.3  1.2    BUN/Creatinine Ratio 18.5  9.4  15.9    Anion Gap 9.0  8.0  6.0      CBC          8/2/2023    15:53 1/18/2024    04:16 4/24/2024    10:41   CBC   WBC 4.90  4.10  3.19    RBC 5.29  5.33  5.19    Hemoglobin 11.7  11.7  11.2    Hematocrit 36.7  36.6  37.0    MCV 69.5  68.6  71.3    MCH 22.1  21.9  21.6    MCHC 31.8  32.0  30.3    RDW 15.9  15.5  15.1    Platelets 334  321  291      TSH          4/24/2024    10:41   TSH   TSH 1.190               CT Head 04/24/2024  IMPRESSION:  Impression:  No acute intracranial abnormality          Neurologic Exam     Mental Status   Oriented to person, place, and time.   Attention: normal.   Speech: speech is normal   Level of consciousness: alert    Cranial Nerves     CN II   Visual fields full to confrontation.      CN III, IV, VI   Pupils are equal, round, and reactive to light.  Extraocular motions are normal.     CN V   Facial sensation intact.     CN VII   Facial expression full, symmetric.     CN VIII   CN VIII normal.     CN IX, X   CN IX normal.   CN X normal.     CN XI   CN XI normal.     CN XII   CN XII normal.     Motor Exam   Muscle bulk: normal  Overall muscle tone: normal  Right arm pronator drift: absent  Left arm pronator drift: absent    Strength   Right deltoid: 5/5  Left deltoid: 5/5  Right biceps: 5/5  Left biceps: 5/5  Right triceps: 5/5  Left triceps: 5/5  Right wrist flexion: 5/5  Left wrist flexion: 5/5  Right wrist extension: 5/5  Left wrist extension: 5/5  Right interossei: 5/5  Left interossei: 5/5  Right iliopsoas: 5/5  Left iliopsoas: 5/5  Right quadriceps: 5/5  Left quadriceps: 5/5  Right hamstrin/5  Left hamstrin/5  Right anterior tibial: 5/5  Left anterior tibial: 5/5Gave way weakness right deltoid     Sensory Exam   Right arm light touch: normal  Left arm light touch: normal  Right leg light touch: decreased from ankle  Left leg light touch: normal  Vibration normal.   Reported decrease in temperature sensation of the right foot     Gait, Coordination, and Reflexes     Coordination   Finger to nose coordination: normal    Tremor   Resting tremor: absent    Reflexes   Right brachioradialis: 2+  Left brachioradialis: 2+  Right biceps: 2+  Left biceps: 2+  Right triceps: 2+  Left triceps: 2+  Right patellar: 2+  Left patellar: 2+  Right achilles: 2+  Left achilles: 2+Swaying with eyes open, Romberg and tandem gait not tested due to balance concerns.  Patient ambulates with cane, slow              Assessment and Plan    Diagnoses and all orders for this visit:    1. Post concussion syndrome (Primary)  -     topiramate (Topamax) 25 MG tablet; Take 1 tablet by mouth every night at bedtime for 7 days, THEN 1 tablet 2 (Two) Times a Day for 7 days, THEN 2 tablets 2 (Two) Times a Day for 30  "days.  Dispense: 60 tablet; Refill: 2  -     EEG (Hospital Performed); Future    2. New daily persistent headache  -     topiramate (Topamax) 25 MG tablet; Take 1 tablet by mouth every night at bedtime for 7 days, THEN 1 tablet 2 (Two) Times a Day for 7 days, THEN 2 tablets 2 (Two) Times a Day for 30 days.  Dispense: 60 tablet; Refill: 2    3. Staring episodes  -     EEG (Hospital Performed); Future      Kimberly Dawson is seen today in hospital follow-up for persistent headache, dizziness, and light sensitivity after concussion on 4/24/2024.  Patient fell at work after losing consciousness and hit her head on the right side.  CT head following day on 5/25/2024 was unremarkable.  Patient continues to have vise-like pressure in her head every day with light sensitivity, dizziness, nausea and vomiting.  Overall she feels \"bad.\"  She also admits to having new onset staring spells and asks about seizure.    On physical exam today, the patient is ill-appearing.  She is light sensitive and keeps her sunglasses on throughout the visit.  No cranial nerve deficit that I can appreciate.  She has reported sensory decrease in the right foot from sciatic nerve issues.  She also has right giveaway weakness otherwise strength 5/5 throughout reflexes 2+ throughout.    Postconcussive syndrome with daily persistent tension-like headache.  We discussed that post concussive headache should improve over time, but about 1/3 of patients will continue to have persistent headache after 3 months.  We discussed treatment options including propranolol, nortriptyline and topiramate.  Patient has severe allergy to body wash and has an EpiPen, so would like to avoid propranolol.  She is currently on duloxetine so we will avoid amitriptyline/nortriptyline because this may increase the risk of serotonin syndrome.  Will start topiramate 25 mg titration.  Discussed the side effects of this medication including but not limited to drowsiness, " paresthesias, and kidney stones.    Patient describes staring spells that started after the concussion.  This could be part of the concussion or could be focal seizure.  Will order 4-hour EEG.  I would like to see if we can get this before she titrates higher up on topiramate.    Dizziness with tinnitus and hearing loss consistent with labyrinthine concussion.  She is going to be working with vestibular/concussive physical therapy at [facility].  I advised her that this may worsen her headache.    She will follow-up in 3 months but is encouraged to contact the office in the meantime with questions or concerns.      I spent 63 minutes caring for Kimberly on this date of service. This time includes time spent by me in the following activities:preparing for the visit, reviewing tests, performing a medically appropriate examination and/or evaluation , counseling and educating the patient/family/caregiver, ordering medications, tests, or procedures, and documenting information in the medical record  Follow Up   Return in about 3 months (around 9/11/2024).  Patient was given instructions and counseling regarding her condition or for health maintenance advice. Please see specific information pulled into the AVS if appropriate.         This document has been electronically signed by HELEN Mast on June 11, 2024 11:53 EDT

## 2024-06-11 ENCOUNTER — OFFICE VISIT (OUTPATIENT)
Dept: NEUROLOGY | Facility: CLINIC | Age: 54
End: 2024-06-11
Payer: MEDICARE

## 2024-06-11 VITALS
HEART RATE: 90 BPM | HEIGHT: 66 IN | SYSTOLIC BLOOD PRESSURE: 125 MMHG | WEIGHT: 228 LBS | DIASTOLIC BLOOD PRESSURE: 84 MMHG | BODY MASS INDEX: 36.64 KG/M2

## 2024-06-11 DIAGNOSIS — G44.52 NEW DAILY PERSISTENT HEADACHE: ICD-10-CM

## 2024-06-11 DIAGNOSIS — F07.81 POST CONCUSSION SYNDROME: Primary | ICD-10-CM

## 2024-06-11 DIAGNOSIS — R40.4 STARING EPISODES: ICD-10-CM

## 2024-06-11 PROCEDURE — 99215 OFFICE O/P EST HI 40 MIN: CPT | Performed by: NURSE PRACTITIONER

## 2024-06-11 PROCEDURE — 1160F RVW MEDS BY RX/DR IN RCRD: CPT | Performed by: NURSE PRACTITIONER

## 2024-06-11 PROCEDURE — 1159F MED LIST DOCD IN RCRD: CPT | Performed by: NURSE PRACTITIONER

## 2024-06-11 RX ORDER — TOPIRAMATE 25 MG/1
TABLET ORAL
Qty: 60 TABLET | Refills: 2 | Status: SHIPPED | OUTPATIENT
Start: 2024-06-11 | End: 2024-07-25

## 2024-07-25 ENCOUNTER — TELEPHONE (OUTPATIENT)
Dept: NEUROLOGY | Facility: CLINIC | Age: 54
End: 2024-07-25
Payer: MEDICARE

## 2024-07-25 NOTE — TELEPHONE ENCOUNTER
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 NO FAX  [email or web address]

## 2024-08-21 NOTE — PROGRESS NOTES
"Chief Complaint  Post concussion syndrome    Subjective          Kimberly Dawson presents to [facility]UROLOG[facility] for Post concussion syndrome.  History of Present Illness    Kimberly Dawson is seen today in follow-up for postconcussive syndrome with headache and continuing dizziness.  The patient was last seen 6/11/2024.  At that time she was continuing to have difficulty with balance and dizziness.  She was irritable and felt \"bad all the time\" with significant fatigue.  She had a constant headache that felt like a vice with pressure and squeezing of her head.  The patient also reported having staring spells where she is unable to respond to family or friends and a 4-hour EEG was ordered.  Her psychiatrist started her on lamotrigine and she canceled the EEG because of the staring spells resolved on this medication.  The patient attended vestibular therapy but treatment worsened ongoing dizziness.  Patient was also started on topiramate and has increased the dose to 50 twice daily for persistent tension-like headache.      Currently, the patient is still having almost the same symptoms as previous.  She still has daily constant headache and ongoing dizziness that she describes as rocking or on a boat.  The only time she does not feel dizzy is when she is sitting still or laying down.  She also has visual dizziness.  She saw Dr. Ramos, otolaryngologist, who ordered MRI IAC and follow-up with CT temporal bones.    She is currently taking topiramate 50 mg twice daily.  She is also taking lamotrigine and duloxetine.  The patient has tried propranolol in the past but this made her dizzy and she was not able to tolerate the side effect.    The patient reports having migraines in the past prior to her hysterectomy.  She had no dizziness with her migraines at that time.  She took Relpax and Maxalt.        =========================================================  06/11/2024 Yue Lya " "Samir presents to [facility]UR[facility] for hospital follow up for headaches and tinnitus.  History of Present Illness  Kimberly Dawson is a 53-year-old female seen today for post concussive headache and dizziness.  The patient fell on 4/24/24 at work.  She states that she lost consciousness and when she \"came to\", her right side of her head and neck were leaning up against a wall.  She went to [facility] and no testing was completed.  The following day she went to [facility] for an evaluation.  Since this fall, she has been off balance and dizzy, she feels irritable and feels \"bad\" all the time, and fatigued.  She has a constant headache which feels like a \"vice\" with pressure and squeezing around her head.  Activity makes symptoms worse.  When her headache worsens she gets more dizzy, nauseated, and vomits.  Patient has constant light sensitivity.  She states that lying down helps headache slightly.  Immediately following the fall the patient had tinnitus and hearing loss on the right.  The hearing has improved but she continues to have tinnitus.  Patient takes Norco every 2 hours for a previous injury and takes Tylenol arthritis rarely.  Patient admits to having history of migraines in the past.  These significantly improved and she has not had many since 2016 when she had a hysterectomy.  Patient also notes having staring spells where she is unable to respond to family or friends.  This has been present since her loss of consciousness/head trauma.  She asks about she seizure activity.     Assessment and Plan    Diagnoses and all orders for this visit:  1. Post concussion syndrome (Primary)  -     topiramate (Topamax) 25 MG tablet; Take 1 tablet by mouth every night at bedtime for 7 days, THEN 1 tablet 2 (Two) Times a Day for 7 days, THEN 2 tablets 2 (Two) Times a Day for 30 days.  Dispense: 60 tablet; Refill: 2  -     EEG (Hospital Performed); Future  2. New daily persistent headache  - " "    topiramate (Topamax) 25 MG tablet; Take 1 tablet by mouth every night at bedtime for 7 days, THEN 1 tablet 2 (Two) Times a Day for 7 days, THEN 2 tablets 2 (Two) Times a Day for 30 days.  Dispense: 60 tablet; Refill: 2  3. Staring episodes  -     EEG (Hospital Performed); Future    Kimberly Dawson is seen today in hospital follow-up for persistent headache, dizziness, and light sensitivity after concussion on 4/24/2024.  Patient fell at work after losing consciousness and hit her head on the right side.  CT head following day on 5/25/2024 was unremarkable.  Patient continues to have vise-like pressure in her head every day with light sensitivity, dizziness, nausea and vomiting.  Overall she feels \"bad.\"  She also admits to having new onset staring spells and asks about seizure.  On physical exam today, the patient is ill-appearing.  She is light sensitive and keeps her sunglasses on throughout the visit.  No cranial nerve deficit that I can appreciate.  She has reported sensory decrease in the right foot from sciatic nerve issues.  She also has right giveaway weakness otherwise strength 5/5 throughout reflexes 2+ throughout.  Postconcussive syndrome with daily persistent tension-like headache.  We discussed that post concussive headache should improve over time, but about 1/3 of patients will continue to have persistent headache after 3 months.  We discussed treatment options including propranolol, nortriptyline and topiramate.  Patient has severe allergy to body wash and has an EpiPen, so would like to avoid propranolol.  She is currently on duloxetine so we will avoid amitriptyline/nortriptyline because this may increase the risk of serotonin syndrome.  Will start topiramate 25 mg titration.  Discussed the side effects of this medication including but not limited to drowsiness, paresthesias, and kidney stones.  Patient describes staring spells that started after the concussion.  This could be part of the " concussion or could be focal seizure.  Will order 4-hour EEG.  I would like to see if we can get this before she titrates higher up on topiramate.  Dizziness with tinnitus and hearing loss consistent with labyrinthine concussion.  She is going to be [facility].  I advised her that this may worsen her headache.  She will follow-up in 3 months but is encouraged to contact the office in the meantime with questions or concerns.          Current Outpatient Medications:     albuterol (PROVENTIL) (2.5 MG/3ML) 0.083% nebulizer solution, albuterol sulfate 2.5 mg/3 mL (0.083 %) solution for nebulization, Disp: , Rfl:     amphetamine-dextroamphetamine (ADDERALL) 10 MG tablet, , Disp: , Rfl:     DULoxetine (CYMBALTA) 60 MG capsule, 30 mg, Disp: , Rfl:     EPINEPHrine (EPIPEN) 0.3 MG/0.3ML solution auto-injector injection, 0.3 mL Daily., Disp: , Rfl:     HYDROcodone-acetaminophen (NORCO)  MG per tablet, , Disp: , Rfl:     lamoTRIgine (LaMICtal) 25 MG tablet, Take 1 tablet by mouth Every 12 (Twelve) Hours., Disp: , Rfl:     tiZANidine (Zanaflex) 4 MG tablet, Every 12 (Twelve) Hours., Disp: , Rfl:     topiramate (Topamax) 50 MG tablet, Wk 1: 50 mg QAM and 100 mg QHS PO Wk 2: 100 mg BID PO, Disp: 120 tablet, Rfl: 2    Rimegepant Sulfate (NURTEC) 75 MG tablet dispersible tablet, Take 1 tablet by mouth Daily As Needed (acture migraine)., Disp: 16 tablet, Rfl: 5    Review of Systems   Constitutional:  Positive for activity change and appetite change.   HENT:  Positive for ear discharge, hearing loss, tinnitus and voice change.    Eyes:  Positive for visual disturbance.   Gastrointestinal:  Positive for nausea and vomiting.   Neurological:  Positive for light-headedness and headaches.   Psychiatric/Behavioral:  Positive for agitation, confusion and sleep disturbance. The patient is nervous/anxious.    All other systems reviewed and are negative.         Objective:    Vital  "Signs:   /88   Pulse 80   Ht 166.4 cm (65.5\")   Wt 103 kg (228 lb)   BMI 37.36 kg/m²     Physical Exam  Vitals reviewed.   Constitutional:       Appearance: She is well-developed. She is obese.   HENT:      Head: Normocephalic and atraumatic.      Ears:      Comments: Slight redness and very minute amount of wetness close to the TM on the right  Eyes:      General: Lids are normal.      Extraocular Movements: Extraocular movements intact.      Pupils: Pupils are equal, round, and reactive to light.   Neck:      Vascular: No carotid bruit.   Cardiovascular:      Rate and Rhythm: Normal rate.      Heart sounds: No murmur heard.  Pulmonary:      Effort: Pulmonary effort is normal.   Musculoskeletal:      Cervical back: Normal range of motion and neck supple.   Skin:     General: Skin is warm and dry.      Findings: No rash.   Neurological:      Mental Status: She is alert and oriented to person, place, and time.      Cranial Nerves: Cranial nerves 2-12 are intact. No cranial nerve deficit.      Sensory: Sensation is intact. No sensory deficit.      Motor: Motor function is intact. No tremor.      Coordination: Romberg sign positive. Finger-Nose-Finger Test normal.      Gait: Gait abnormal.   Psychiatric:         Attention and Perception: Attention normal.         Mood and Affect: Mood normal.         Speech: Speech normal.         Behavior: Behavior normal.         Cognition and Memory: Cognition and memory normal.         Judgment: Judgment normal.        Result Review :          MRI brain with IACs with and without IV contrast 8/19/2024  IMPRESSION:   1. Right external auditory canal is obstructed proximally with fluid signal in the distal external auditory canal. Recommend direct visualization and temporal bone CT.     2. No signal or structural abnormality identified within the labyrinthine structures.     3. Visualized brain demonstrates no evidence of acute intracranial infarct, hemorrhage or mass " effect         Neurological Exam  Mental Status  Alert. Oriented to person, place and time. Oriented to person, place, and time. Memory is normal. Recent and remote memory are intact. Speech is normal. Language is fluent with no aphasia. Attention and concentration are normal. Fund of knowledge is appropriate for level of education.    Cranial Nerves  CN II: Visual acuity is normal. Visual fields full to confrontation.  CN III, IV, VI: Extraocular movements intact bilaterally. Normal lids and orbits bilaterally. Pupils equal round and reactive to light bilaterally.  CN V: Facial sensation is normal.  CN VII: Full and symmetric facial movement.  CN VIII: Decreased high-frequency hearing loss on right.  CN IX, X: Palate elevates symmetrically. Normal gag reflex.  CN XI: Shoulder shrug strength is normal.  CN XII: Tongue midline without atrophy or fasciculations.    Motor  Normal muscle bulk throughout. No abnormal involuntary movements. No pronator drift.    Sensory  Normal sensation.Light touch is normal in upper and lower extremities.     Coordination  TremorsRight: Finger-to-nose normal.Left: Finger-to-nose normal.  No tremor.    Gait   Abnormal gait. Romberg is present.  Patient had some balance difficulty when standing initially.         Assessment and Plan    Diagnoses and all orders for this visit:    1. Post concussion syndrome (Primary)  -     topiramate (Topamax) 50 MG tablet; Wk 1: 50 mg QAM and 100 mg QHS PO Wk 2: 100 mg BID PO  Dispense: 120 tablet; Refill: 2    2. New daily persistent headache  -     topiramate (Topamax) 50 MG tablet; Wk 1: 50 mg QAM and 100 mg QHS PO Wk 2: 100 mg BID PO  Dispense: 120 tablet; Refill: 2    3. Persistent postural-perceptual dizziness    4. Intractable migraine without aura and with status migrainosus  -     Rimegepant Sulfate (NURTEC) 75 MG tablet dispersible tablet; Take 1 tablet by mouth Daily As Needed (acture migraine).  Dispense: 16 tablet; Refill: 5       Kimberly  Samir is seen today in follow-up for postconcussive syndrome with dizziness, headache, and light sensitivity.  She was started on topiramate after last visit and she is currently taking 50 mg twice daily.  Her symptoms are essentially unchanged from last visit and she was unable to tolerate vestibular therapy because it worsened her dizziness.    She is seeing ENT who ordered MRI IACs revealed some fluid in the right EAC.  I reviewed the results of the MRI IAC with the patient today.  ENT Dr. Ramos, has ordered CT temporal bone to further evaluate.    I would like to increase topiramate up to 100 mg twice daily.  She will increase the nighttime dose for 1 week and then increase the morning dose.  If this is ineffective then we we will discuss starting her on CGRP injectable oral medication or considering Botox.  She is not a candidate for propranolol because it has made her very dizzy in the past.  She is also not a candidate for TCA because she is currently taking duloxetine and this combination can contribute to serotonin syndrome.    I would like her to try samples of Nurtec (LOT [id number], exp 5/26) and Ubrelvy (LOT [id number], exp 1/26).  I would like to see if this improves her headache.  She is unable to take a triptan due to hypertension.    I believe some of her issues could be PPPD (persistent postural perceptual dizziness).  I provided the patient with a handout discussing this diagnosis.  Typically we take a 3 pronged approach including medication, vestibular physical therapy, and cognitive behavioral therapy.  The patient is interested in pursuing CBT and will contact her insurance company to see if she can find someone in network.    She will follow-up in 3 months or sooner if needed.          I spent 59 minutes caring for Kimberly on this date of service. This time includes time spent by me in the following activities:preparing for the visit, reviewing tests, performing a medically appropriate examination  and/or evaluation , counseling and educating the patient/family/caregiver, ordering medications, tests, or procedures, and documenting information in the medical record  Follow Up   Return in about 3 months (around 11/22/2024).  Patient was given instructions and counseling regarding her condition or for health maintenance advice. Please see specific information pulled into the AVS if appropriate.     This document has been electronically signed by HELEN Mast  on August 22, 2024 16:00 EDT

## 2024-08-22 ENCOUNTER — OFFICE VISIT (OUTPATIENT)
Dept: NEUROLOGY | Facility: CLINIC | Age: 54
End: 2024-08-22
Payer: OTHER MISCELLANEOUS

## 2024-08-22 VITALS
WEIGHT: 228 LBS | BODY MASS INDEX: 36.64 KG/M2 | HEART RATE: 80 BPM | HEIGHT: 66 IN | SYSTOLIC BLOOD PRESSURE: 158 MMHG | DIASTOLIC BLOOD PRESSURE: 88 MMHG

## 2024-08-22 DIAGNOSIS — G43.011 INTRACTABLE MIGRAINE WITHOUT AURA AND WITH STATUS MIGRAINOSUS: ICD-10-CM

## 2024-08-22 DIAGNOSIS — G44.52 NEW DAILY PERSISTENT HEADACHE: ICD-10-CM

## 2024-08-22 DIAGNOSIS — H81.8X9 PERSISTENT POSTURAL-PERCEPTUAL DIZZINESS: ICD-10-CM

## 2024-08-22 DIAGNOSIS — F07.81 POST CONCUSSION SYNDROME: Primary | ICD-10-CM

## 2024-08-22 RX ORDER — LAMOTRIGINE 25 MG/1
1 TABLET ORAL EVERY 12 HOURS SCHEDULED
COMMUNITY
Start: 2024-07-01

## 2024-08-22 RX ORDER — TOPIRAMATE 50 MG/1
TABLET, FILM COATED ORAL
Qty: 120 TABLET | Refills: 2 | Status: SHIPPED | OUTPATIENT
Start: 2024-08-22

## 2024-08-23 ENCOUNTER — SPECIALTY PHARMACY (OUTPATIENT)
Dept: INFUSION THERAPY | Facility: HOSPITAL | Age: 54
End: 2024-08-23
Payer: OTHER MISCELLANEOUS

## 2024-08-23 DIAGNOSIS — G43.011 INTRACTABLE MIGRAINE WITHOUT AURA AND WITH STATUS MIGRAINOSUS: Primary | ICD-10-CM

## 2024-08-23 NOTE — PROGRESS NOTES
Specialty Pharmacy Refill Coordination Note     Ubrelvy 100mg #16 per 30 days approved until 12/31/2024      Sheron Thompson  Specialty Pharmacy Technician

## 2024-08-26 ENCOUNTER — PATIENT OUTREACH (OUTPATIENT)
Dept: CASE MANAGEMENT | Facility: OTHER | Age: 54
End: 2024-08-26
Payer: OTHER MISCELLANEOUS

## 2024-08-26 ENCOUNTER — SPECIALTY PHARMACY (OUTPATIENT)
Dept: INFUSION THERAPY | Facility: HOSPITAL | Age: 54
End: 2024-08-26
Payer: OTHER MISCELLANEOUS

## 2024-08-26 NOTE — OUTREACH NOTE
AMBULATORY CASE MANAGEMENT NOTE    Names and Relationships of Patient/Support Persons: Contact: Kimberly Dawson; Relationship: Self -     Patient Outreach  RN-ACM outreach with patient regarding identified proactive HRCM review. Reviewed with patient role of RN-ACM and HRCM case management services. Patient verbalized understanding. Patient states to have recent neurology appointment; received recommendations and awaiting for insurance authorization/approval regarding medications recommended. Patient states to continue with headaches; nausea ; dizziness and difficulty with balance. Patient states to have decrease in appetite; sleep and no difficulty with chest pain or SOB. Patient states to be currently attending [facility] for therapy one time per week. Patient states to have difficulty hearing from right ear; has ENT and audiology appointment scheduled. Patient voiced intent to receive hearing aids. Patient states to ambulate with cane as she is having difficulty with gait. Reviewed with patient education and verbalized understanding. Patient states to appreciate outreach and requests additional outreach. Additional outreach scheduled. No further questions voiced at this time.     Adult Patient Profile  Questions/Answers      Flowsheet Row Most Recent Value   Symptoms/Conditions Managed at Home other (see comments)  [Post concussion syndrome,  migraine headaches,  nausea and dizziness]   Identifying Health Goals keep illness under control   Barriers to Taking Medication as Prescribed none  [Patient states to currently awaiting for authorization regardg medication recently prescribed]   Equipment Currently Used at Home cane, straight   Primary Source of Support/Comfort child(ras)   Name of Support/Comfort Primary Source Patient states to receive assistance from family as needed as she states to be receiving assitance with transportation and errands.   People in Home alone        Social Work Assessment  Questions/Answers       Flowsheet Row Most Recent Value   People in Home alone   Functional Status Comments Patient states to be independent with ADL's,  ambulating with cane and receiving assistance with transportation and also using transportation from insurance assistance.   Equipment Currently Used at Home cane, straight        Send Education  Questions/Answers      Flowsheet Row Most Recent Value   Other Patient Education/Resources  24/7 [facility] Nurse Call Line, Raymundo   24/7 Nurse Call Line Education Method Verbal        SDOH updated and reviewed with the patient during this program:  --     Food Insecurity: No Food Insecurity (8/26/2024)    Hunger Vital Sign     Worried About Running Out of Food in the Last Year: Never true     Ran Out of Food in the Last Year: Never true      --     Transportation Needs: No Transportation Needs (8/26/2024)    PRAPARE - Transportation     Lack of Transportation (Medical): No     Lack of Transportation (Non-Medical): No     Education Documentation  Unresolved/Worsening Symptoms, taught by Deandra Lopez, RN at 8/26/2024  1:23 PM.  Learner: Patient  Readiness: Acceptance  Method: Explanation  Response: Verbalizes Understanding    Home Safety, taught by Deandra Lopez, RN at 8/26/2024  1:23 PM.  Learner: Patient  Readiness: Acceptance  Method: Explanation  Response: Verbalizes Understanding    Energy Conservation, taught by Deandra Lopez, RN at 8/26/2024  1:23 PM.  Learner: Patient  Readiness: Acceptance  Method: Explanation  Response: Verbalizes Understanding          Deandra DUARTE  Ambulatory Case Management    8/26/2024, 13:23 EDT

## 2024-08-27 ENCOUNTER — SPECIALTY PHARMACY (OUTPATIENT)
Dept: INFUSION THERAPY | Facility: HOSPITAL | Age: 54
End: 2024-08-27
Payer: OTHER MISCELLANEOUS

## 2024-08-27 NOTE — PROGRESS NOTES
Specialty Pharmacy Patient Management Program  Neurology Initial Assessment     Kimberly Dawson is a 53 y.o. female with migraines seen by a Neurology provider and enrolled in the Neurology Patient Management program offered by [facility].  An initial outreach was conducted, including assessment of therapy appropriateness and specialty medication education for Ubrelvy 100mg. The patient was introduced to services offered by [facility], including: regular assessments, refill coordination, curbside pick-up or mail order delivery options, prior authorization maintenance, and financial assistance programs as applicable. The patient was also provided with contact information for the pharmacy team.     Insurance Coverage & Financial Support  Workers Comp    Relevant Past Medical History and Comorbidities  Relevant medical history and concomitant health conditions were discussed with the patient. The patient's chart has been reviewed for relevant past medical history and comorbid health conditions and updated as necessary.   Past Medical History:   Diagnosis Date    Acid reflux     Injury of back     Migraine June 1998    Neck complaint     Peripheral neuropathy May 2022    PTSD (post-traumatic stress disorder)     from elevator falling incident     Social History     Socioeconomic History    Marital status: Single   Tobacco Use    Smoking status: Never    Smokeless tobacco: Never   Vaping Use    Vaping status: Never Used   Substance and Sexual Activity    Alcohol use: Never    Drug use: Never    Sexual activity: Not Currently     Partners: Male     Comment: Hysterectomy     Problem list reviewed by Kurtis Martinez [facility] on 8/27/2024 at 10:29 AM    Allergies  Known allergies and reactions were discussed with the patient. The patient's chart has been reviewed for  allergy information and updated as necessary.   Allergies   Allergen Reactions    Morphine Nausea And  Vomiting    Amoxicillin Other (See Comments)     Amoxicillin alone 'flares up acid reflux', but she is able to take/tolerate augmentin    Codeine Nausea Only     Allergies reviewed by Kurtis Martinez [facility] on 8/27/2024 at 10:29 AM    Relevant Laboratory Values  Common labs          1/18/2024    04:16 4/24/2024    10:41   Common Labs   Glucose 102  66    BUN 8  13    Creatinine 0.85  0.82    Sodium 140  141    Potassium 3.6  3.7    Chloride 105  106    Calcium 9.2  9.4    Albumin 3.9  3.9    Total Bilirubin 0.7  0.5    Alkaline Phosphatase 58  65    AST (SGOT) 16  18    ALT (SGPT) 13  15    WBC 4.10  3.19    Hemoglobin 11.7  11.2    Hematocrit 36.6  37.0    Platelets 321  291        Lab Assessment  The above labs have been reviewed. No dose adjustments are needed for the specialty medication Ubrelvy based on the labs.     Current Medication List  This medication list has been reviewed with the patient and evaluated for any interactions or necessary modifications/recommendations, and updated to include all prescription medications, OTC medications, and supplements the patient is currently taking.  This list reflects what is contained in the patient's profile, which has also been marked as reviewed to communicate to other providers it is the most up to date version of the patient's current medication therapy.     Current Outpatient Medications:     albuterol (PROVENTIL) (2.5 MG/3ML) 0.083% nebulizer solution, albuterol sulfate 2.5 mg/3 mL (0.083 %) solution for nebulization, Disp: , Rfl:     amphetamine-dextroamphetamine (ADDERALL) 10 MG tablet, , Disp: , Rfl:     DULoxetine (CYMBALTA) 60 MG capsule, 30 mg, Disp: , Rfl:     EPINEPHrine (EPIPEN) 0.3 MG/0.3ML solution auto-injector injection, 0.3 mL Daily., Disp: , Rfl:     HYDROcodone-acetaminophen (NORCO)  MG per tablet, , Disp: , Rfl:     lamoTRIgine (LaMICtal) 25 MG tablet, Take 1 tablet by mouth Every 12 (Twelve) Hours., Disp: , Rfl:     tiZANidine  (Zanaflex) 4 MG tablet, Every 12 (Twelve) Hours., Disp: , Rfl:     topiramate (Topamax) 50 MG tablet, Wk 1: 50 mg QAM and 100 mg QHS PO Wk 2: 100 mg BID PO, Disp: 120 tablet, Rfl: 2    ubrogepant (Ubrelvy) 100 MG tablet, Take 1 tablet by mouth As Needed at Onset of Migraine. May repeat 2 hours later if needed. Max dose 200mg in 24 hours., Disp: 30 tablet, Rfl: 5    Medicines reviewed by Kurtis Martinez [facility] on 8/27/2024 at 10:29 AM    Drug Interactions  The patient's medication profile has been reviewed for accuracy and assessed for interactions. There are currently no significant interactions to address at this time, however, the patient has been encouraged to notify our office if they plan to start any new medications or supplements.      Initial Education Provided for Specialty Medication  The patient has been provided with the following education and any applicable administration techniques (i.e. self-injection) have been demonstrated for the therapies indicated. All questions and concerns have been addressed prior to the patient receiving the medication, and the patient has verbalized understanding of the education and any materials provided.  Additional patient education shall be provided and documented upon request by the patient, provider or payer.      Ubrelvy (Ubrogepant) 100 mg tablet, take 1 tablet by mouth as needed for headache symptoms. May repeat 1 tablet in 2 hours for continued symptoms. Max of 2 tablets over 24 hours.   Medication Expectations   Why am I taking this medication? You are taking this medication to treat acute migraines.   What should I expect while on this medication? You should expect to see a decrease in the frequency and severity of your migraines.   How does the medication work? Ubrelvy is a small molecule that binds to calcitonin gene-related peptide (CGRP) and blocks its binding to the receptor decreasing the severity of migraines.   How long will I be on this  medication for? The amount of time you will be on this medication will be determined by your doctor and your response to the medication.    How do I take this medication? Take as directed on your prescription label. Reviewed plan for ubrogepant 100 mg (1 tablet) PO daily PRN; may repeat 1 tablet in 2 hours, if needed. Max dose per day is 200 mg over 24 hours.    What are some possible side effects? Potential side effects including, but not limited to nausea and somnolence. Patient verbalized understanding.   What happens if I miss a dose? This is taken as needed for migraines.     Medication Safety   What are things I should warn my doctor immediately about? Allergic reaction: Itching or hives, swelling in your face or hands, swelling or tingling in your mouth or throat, chest tightness, trouble breathing     What are things that I should be cautious of? Tell your doctor if you are pregnant or breastfeeding, or if you have kidney disease or liver disease.   What are some medications that can interact with this one? Concomitant use of strong CY inhibitors, check with your pharmacist or provider before starting any new medications, avoid grapefruit juice.     Medication Storage/Handling   How should I handle this medication? Keep this medication out of reach of pets/children.   How does this medication need to be stored? Store at a controlled room temperature between 20 and 25 degrees C (68 and 77 degrees F), with excursions permitted between 15 and 30 degrees C (59 and 86 degrees F) away from direct sunlight and moisture.   How should I dispose of this medication? There should not be a need to dispose of this medication unless your provider decides to change the dose or therapy. If that is the case, take to your local police station for proper disposal. Some pharmacies also have take-back bins for medication drop-off.      Resources/Support   How can I remind myself to take this medication? This is taken as  needed for migraines.   Is financial support available?  Yes, [employer] can provide co-pay cards if you have commercial insurance or patient assistance if you have Medicare or no insurance.    Which vaccines are recommended for me? Talk to your doctor about these vaccines: Flu, Coronavirus (COVID-19), Pneumococcal (pneumonia), Tdap, Hepatitis B, Zoster (shingles)        Adherence and Self-Administration  Adherence related to the patient's specialty therapy was discussed with the patient. The Adherence segment of this outreach has been reviewed and updated.   Is there a concern with patient's ability to self administer the medication correctly and without issue?: No  Were any potential barriers to adherence identified during the initial assessment or patient education?: No  Are there any concerns regarding the patient's understanding of the importance of medication adherence?: No  Methods for Supporting Patient Adherence and/or Self-Administration: Patient was instructed on appropriate timing of Ubrelvy administration as well as the option of a second dose in two hours if symptoms persist or come back.     Goals of Therapy  Goals related to the patient's specialty therapy were discussed with the patient. The Patient Goals segment of this outreach has been reviewed and updated.   Goals Addressed Today        Specialty Pharmacy General Goal      Patient is currently experiencing post concussion headaches associated with a recent  head injury at work. She is not experiencing typical migraine symptoms and her headaches are consistent for the most part. Her average symptom score is 6-7/10 and she is hoping for Ubrelvy to reduce her symptoms by at least 50% within an hour of her dose so that she can continue to work and perform her normal ADLs.  She does not expect Ubrelvy to completely alleviate her symptoms but would be very pleased if she can have a headache free day.                Reassessment Plan &  Follow-Up  Medication Therapy Changes: Per Yue De La Fuente, the patient is to start Ubrelvy 100mg once as needed for headache and may repeat her dose in two hours if needed with a maximum daily dose of 200mg.   Related Plans, Therapy Recommendations, or Therapy Problems to Be Addressed: Will ensure the patient maintains an adequate supply of Ubrelvy on hand at all times. Will also help the patient adjust her abortive therapy if ineffective and assess the need for prophylactic therapy if needed.   Pharmacist to perform regular reassessments no more than (6) months from the previous assessment.  Care Coordinator to set up future refill outreaches, coordinate prescription delivery, and escalate clinical questions to pharmacist.   Welcome information and patient satisfaction survey to be sent by specialty pharmacy team with patient's initial fill.    Attestation  Therapeutic appropriateness: Appropriate   I attest the patient was actively involved in and has agreed to the above plan of care. If the prescribed therapy is at any point deemed not appropriate based on the current or future assessments, a consultation will be initiated with the patient's specialty care provider to determine the best course of action. The revised plan of therapy will be documented along with any additional patient education provided. Discussed aforementioned material with patient by phone.    Pavan Martinez, PharmD  Clinic Specialty Pharmacist, Neurology  8/27/2024  10:38 EDT

## 2024-08-27 NOTE — PROGRESS NOTES
Specialty Pharmacy Refill Coordination Note     Kimberly is a 53 y.o. female contacted today regarding refills of her specialty medication(s).    Specialty medication(s) and dose(s) confirmed: Ubrelvy 100mg  Changes to medications: no  Changes to insurance: no  Reviewed and verified with patient:  Allergies  Meds  Problems             Delivery Questions      Flowsheet Row Most Recent Value   Delivery method UPS   Delivery address verified with patient/caregiver? Yes   Delivery address Home   Number of medications in delivery 1   Medication(s) being filled and delivered Ubrogepant   Doses left of specialty medications 0   Copay verified? Yes   Copay amount 0   Copay form of payment No copayment ($0)   Ship Date 08/27   Delivery Date 8/28   Signature Required No                 Follow-up: 23 day(s)     Kurtis Martinez [facility]  8/27/2024   10:41 EDT

## 2024-09-20 ENCOUNTER — SPECIALTY PHARMACY (OUTPATIENT)
Dept: INFUSION THERAPY | Facility: HOSPITAL | Age: 54
End: 2024-09-20
Payer: OTHER MISCELLANEOUS

## 2024-09-24 ENCOUNTER — SPECIALTY PHARMACY (OUTPATIENT)
Dept: INFUSION THERAPY | Facility: HOSPITAL | Age: 54
End: 2024-09-24
Payer: OTHER MISCELLANEOUS

## 2024-10-22 ENCOUNTER — SPECIALTY PHARMACY (OUTPATIENT)
Dept: INFUSION THERAPY | Facility: HOSPITAL | Age: 54
End: 2024-10-22
Payer: OTHER MISCELLANEOUS

## 2024-10-22 NOTE — PROGRESS NOTES
Called [employer] and spoke w/ Hellen, representative. Per Hellen, she sees where I called on 9/24, however, the agent I spoke with on 9/24 did not follow through with submitting the request for an approval of 5 more fills for Ubrelvy.  The original approval for Ubrelvy was only for a one time fill of a quantity of 10 tablets. Hellen advised she will fax over application to [phone] and that once I submit the required paper work they will call and fax the determination.   Krutis Martinez, [facility]  10/22/24  13:08 EDT

## 2024-10-25 ENCOUNTER — PATIENT OUTREACH (OUTPATIENT)
Dept: CASE MANAGEMENT | Facility: OTHER | Age: 54
End: 2024-10-25
Payer: OTHER MISCELLANEOUS

## 2024-10-25 NOTE — OUTREACH NOTE
AMBULATORY CASE MANAGEMENT NOTE    Names and Relationships of Patient/Support Persons: Contact: Kimberly Dawson; Relationship: Self -     Patient Outreach  RN-ACM outreach with patient. Patient states to be compliant with medications and medical appointments. Patient states to have upcoming neurology appointment on 11/22/24. Patient states to continue with episodes of headaches; nausea; dizziness and difficulty with balance. Patient states to have had no difficulty with falls; ambulating with cane and no longer receiving KORT therapy. Patient voiced intent to discuss with PCP OT benefits. Patient states to continue with hearing difficulty with both ears ; states hard to distinguish sounds; has most difficulty with right ear and states will receive evaluation regarding cochlear implant. Patient states to have difficulty sleeping and no difficulty with chest pain or SOB.Reviewed with patient and verbalized understanding.  Patient states to be receiving call from physicians; needs to discontinue outreach and requests additional outreach .    Education Documentation  When to Seek Medical Attention, taught by Deandra Lopez RN at 10/25/2024  3:41 PM.  Learner: Patient  Readiness: Acceptance  Method: Explanation  Response: Verbalizes Understanding    Participation with Disease Management Plan, taught by Deandra Lopez RN at 10/25/2024  3:41 PM.  Learner: Patient  Readiness: Acceptance  Method: Explanation  Response: Verbalizes Understanding    Unresolved/Worsening Symptoms, taught by Deandra Lopez RN at 10/25/2024  3:41 PM.  Learner: Patient  Readiness: Acceptance  Method: Explanation  Response: Verbalizes Understanding    Energy Conservation, taught by Deandra Lopez RN at 10/25/2024  3:41 PM.  Learner: Patient  Readiness: Acceptance  Method: Explanation  Response: Verbalizes Understanding          Deandra DUARTE  Ambulatory Case Management    10/25/2024, 15:42 EDT

## 2024-11-01 ENCOUNTER — SPECIALTY PHARMACY (OUTPATIENT)
Dept: INFUSION THERAPY | Facility: HOSPITAL | Age: 54
End: 2024-11-01
Payer: OTHER MISCELLANEOUS

## 2024-11-04 ENCOUNTER — SPECIALTY PHARMACY (OUTPATIENT)
Dept: INFUSION THERAPY | Facility: HOSPITAL | Age: 54
End: 2024-11-04
Payer: OTHER MISCELLANEOUS

## 2024-11-04 NOTE — PROGRESS NOTES
We are still receiving a claim rejection stating a PA is needed for Ubrelvy.  Was able to find the phone number and email address for the carrier's . Was unable to contact via phone, however, did email Savannah Pineda, the individual assigned to the claim.  Provided a detailed summary of the issue and requested a call/email to see if we can obtain an approval for the remaining fills.  Kurtis Martinez, [facility]  11/04/24  16:13 EST

## 2024-11-05 ENCOUNTER — SPECIALTY PHARMACY (OUTPATIENT)
Dept: INFUSION THERAPY | Facility: HOSPITAL | Age: 54
End: 2024-11-05
Payer: OTHER MISCELLANEOUS

## 2024-11-07 ENCOUNTER — SPECIALTY PHARMACY (OUTPATIENT)
Dept: INFUSION THERAPY | Facility: HOSPITAL | Age: 54
End: 2024-11-07
Payer: OTHER MISCELLANEOUS

## 2024-11-21 NOTE — PROGRESS NOTES
Chief Complaint  Migraine    Subjective          Kimberly Dawson presents to [facility] for Post concussion syndrome   History of Present Illness    Kimberly Dawson is seen today in follow-up for postconcussive syndrome with headache and continued dizziness after a fall at work on 4/24/2024.  She was last seen in the office 8/22/2024 and was started on topiramate.  She did not not notice an improvement in her symptoms with 50 mg twice a day and dose was increased to 100 twice a day.  I provided her samples of Nurtec and Ubrelvy, but needed to avoid triptans due to hypertension.  She was unable to tolerate vestibular therapy due to dizziness.    Patient states that she has reduced the topiramate to 100 mg nightly because she did not feel an improvement on 100 mg twice a day.  She continues to have near constant headache with light and sound sensitivity.  She took Ubrelvy after eating Chinese food which worsened her headache and caused a migraine.  Ubrelvy cleared the migraine within 45 minutes though she continues to have a constant underlying headache.    She recently saw Dr. Garcia and she is a candidate for cochlear implant for hearing loss on the right side.  She is going to first try a specialty hearing aid before undergoing the implant.  Dr. Garcia feels her balance is directly related to the inner ear.      Follow up on migraine  Medication: topiramate / UBRELVY/   Duration-headache is every day--topiramate doesn't take it away  Change in migraine-same            ============================================================================  Outpatient visit 8--HELEN Mast  Assessment and Plan    Diagnoses and all orders for this visit:     1. Post concussion syndrome (Primary)  -     topiramate (Topamax) 50 MG tablet; Wk 1: 50 mg QAM and 100 mg QHS PO Wk 2: 100 mg BID PO  Dispense: 120 tablet; Refill: 2     2. New daily persistent headache  -     topiramate (Topamax) 50 MG  tablet; Wk 1: 50 mg QAM and 100 mg QHS PO Wk 2: 100 mg BID PO  Dispense: 120 tablet; Refill: 2     3. Persistent postural-perceptual dizziness     4. Intractable migraine without aura and with status migrainosus  -     Rimegepant Sulfate (NURTEC) 75 MG tablet dispersible tablet; Take 1 tablet by mouth Daily As Needed (acture migraine).  Dispense: 16 tablet; Refill: 5        Kimberly Dawson is seen today in follow-up for postconcussive syndrome with dizziness, headache, and light sensitivity.  She was started on topiramate after last visit and she is currently taking 50 mg twice daily.  Her symptoms are essentially unchanged from last visit and she was unable to tolerate vestibular therapy because it worsened her dizziness.     She is seeing ENT who ordered MRI IACs revealed some fluid in the right EAC.  I reviewed the results of the MRI IAC with the patient today.  ENT Dr. Ramos, has ordered CT temporal bone to further evaluate.     I would like to increase topiramate up to 100 mg twice daily.  She will increase the nighttime dose for 1 week and then increase the morning dose.  If this is ineffective then we we will discuss starting her on CGRP injectable oral medication or considering Botox.  She is not a candidate for propranolol because it has made her very dizzy in the past.  She is also not a candidate for TCA because she is currently taking duloxetine and this combination can contribute to serotonin syndrome.     I would like her to try samples of Nurtec (LOT [id number], exp 5/26) and Ubrelvy (LOT [id number], exp 1/26).  I would like to see if this improves her headache.  She is unable to take a triptan due to hypertension.     I believe some of her issues could be PPPD (persistent postural perceptual dizziness).  I provided the patient with a handout discussing this diagnosis.  Typically we take a 3 pronged approach including medication, vestibular physical therapy, and cognitive behavioral therapy.  The patient is  "interested in pursuing CBT and will contact her insurance company to see if she can find someone in network.       Current Outpatient Medications:     albuterol (PROVENTIL) (2.5 MG/3ML) 0.083% nebulizer solution, albuterol sulfate 2.5 mg/3 mL (0.083 %) solution for nebulization, Disp: , Rfl:     amphetamine-dextroamphetamine (ADDERALL) 10 MG tablet, , Disp: , Rfl:     DULoxetine (CYMBALTA) 60 MG capsule, 30 mg, Disp: , Rfl:     EPINEPHrine (EPIPEN) 0.3 MG/0.3ML solution auto-injector injection, 0.3 mL Daily., Disp: , Rfl:     HYDROcodone-acetaminophen (NORCO)  MG per tablet, , Disp: , Rfl:     ibuprofen (ADVIL,MOTRIN) 800 MG tablet, Take 1 tablet by mouth Every 8 (Eight) Hours As Needed. for pain, Disp: , Rfl:     lidocaine (LIDODERM) 5 %, APPLY 1 PATCH TOPICALLY TO THE SKIN DAILY. MAY WEAR UP TO 12 HOURS, Disp: , Rfl:     tiZANidine (Zanaflex) 4 MG tablet, Every 12 (Twelve) Hours., Disp: , Rfl:     topiramate (Topamax) 50 MG tablet, Wk 1: 50 mg QAM and 100 mg QHS PO Wk 2: 100 mg BID PO (Patient taking differently: Take 2 tablets by mouth Daily. 1 at night), Disp: 120 tablet, Rfl: 2    ubrogepant (Ubrelvy) 100 MG tablet, Take 1 tablet by mouth As Needed at Onset of Migraine. May repeat 2 hours later if needed. Max dose 200mg in 24 hours., Disp: 30 tablet, Rfl: 5    Atogepant (Qulipta) 60 MG tablet, Take 1 tablet by mouth Daily., Disp: 30 tablet, Rfl: 5    ondansetron (ZOFRAN) 8 MG tablet, Take 1 tablet by mouth Every 12 (Twelve) Hours As Needed for Nausea or Vomiting., Disp: , Rfl:     Review of Systems   Neurological:  Positive for dizziness, weakness and headaches.   All other systems reviewed and are negative.         Objective:    Vital Signs:   /94   Pulse 84   Ht 166.4 cm (65.5\")   Wt 107 kg (236 lb)   BMI 38.68 kg/m²     Physical Exam  Vitals reviewed.   Constitutional:       Appearance: She is well-developed. She is obese.   HENT:      Head: Normocephalic and atraumatic.      Right Ear: " Decreased hearing noted.   Eyes:      General: Lids are normal.      Extraocular Movements: Extraocular movements intact.   Cardiovascular:      Rate and Rhythm: Normal rate.   Pulmonary:      Effort: Pulmonary effort is normal.   Musculoskeletal:      Cervical back: Normal range of motion and neck supple.   Skin:     General: Skin is warm and dry.      Findings: No rash.   Neurological:      Mental Status: She is alert and oriented to person, place, and time.      Cranial Nerves: Cranial nerves 2-12 are intact. Cranial nerve deficit present.      Sensory: No sensory deficit.      Motor: Motor function is intact. No tremor.      Coordination: Finger-Nose-Finger Test normal. Rapid alternating movements normal.      Gait: Gait is intact.      Comments: Hearing loss right, did not test pupil reaction due to light sensitivity   Psychiatric:         Attention and Perception: Attention normal.         Mood and Affect: Mood normal.         Speech: Speech normal.         Behavior: Behavior normal.         Cognition and Memory: Cognition and memory normal.         Judgment: Judgment normal.        Result Review :                Neurological Exam  Mental Status  Alert. Oriented to person, place and time. Oriented to person, place, and time. Memory is normal. Recent and remote memory are intact. Speech is normal. Language is fluent with no aphasia. Attention and concentration are normal. Fund of knowledge is appropriate for level of education.    Cranial Nerves  CN III, IV, VI: Extraocular movements intact bilaterally. Normal lids and orbits bilaterally.  CN V: Facial sensation is normal.  CN VII: Full and symmetric facial movement.  CN VIII: Decreased hearing right.  CN IX, X: Palate elevates symmetrically. Normal gag reflex.  CN XI: Shoulder shrug strength is normal.  CN XII: Tongue midline without atrophy or fasciculations.    Motor   No abnormal involuntary movements. No pronator drift.    Sensory  Light touch is normal in  upper and lower extremities.     Reflexes  Right Plantar: downgoing    Coordination  No tremorRight: Finger-to-nose normal. Rapid alternating movement normal.Left: Finger-to-nose normal. Rapid alternating movement normal.    Gait  Casual gait: Normal gait.  Ambulates with a cane.         Assessment and Plan    Diagnoses and all orders for this visit:    1. Intractable migraine without aura and with status migrainosus (Primary)  -     Atogepant (Qulipta) 60 MG tablet; Take 1 tablet by mouth Daily.  Dispense: 30 tablet; Refill: 5    2. Persistent postural-perceptual dizziness    3. New daily persistent headache    4. Post concussion syndrome       Kimberly Dawson is seen today for postconcussive syndrome with chronic daily headache and persistent dizziness.  She has migraine with light and sound sensitivity.  Ubrelvy helped ease migraine.  She continues on topiramate 100 mg daily, but did not find benefit with the 100 mg twice a day.    Will start Qulipta 60 mg daily to see if we can reduce her daily headache/migraine.  I provided her 4 samples of Qulipta (LOT [id number], exp 9/26).    Continue Ubrelvy as needed    I provided her name and phone number of a therapist.    Continue to work with Dr. Garcia.    Follow-up 4 months        Follow Up   Return for 4 months.  Patient was given instructions and counseling regarding her condition or for health maintenance advice. Please see specific information pulled into the AVS if appropriate.     This document has been electronically signed by HELEN Mast  on November 22, 2024 14:23 EST

## 2024-11-22 ENCOUNTER — PATIENT OUTREACH (OUTPATIENT)
Dept: CASE MANAGEMENT | Facility: OTHER | Age: 54
End: 2024-11-22
Payer: OTHER MISCELLANEOUS

## 2024-11-22 ENCOUNTER — OFFICE VISIT (OUTPATIENT)
Dept: NEUROLOGY | Facility: CLINIC | Age: 54
End: 2024-11-22
Payer: OTHER MISCELLANEOUS

## 2024-11-22 VITALS
HEIGHT: 66 IN | SYSTOLIC BLOOD PRESSURE: 147 MMHG | DIASTOLIC BLOOD PRESSURE: 94 MMHG | WEIGHT: 236 LBS | BODY MASS INDEX: 37.93 KG/M2 | HEART RATE: 84 BPM

## 2024-11-22 DIAGNOSIS — G43.011 INTRACTABLE MIGRAINE WITHOUT AURA AND WITH STATUS MIGRAINOSUS: Primary | ICD-10-CM

## 2024-11-22 DIAGNOSIS — G44.52 NEW DAILY PERSISTENT HEADACHE: ICD-10-CM

## 2024-11-22 DIAGNOSIS — F07.81 POST CONCUSSION SYNDROME: ICD-10-CM

## 2024-11-22 DIAGNOSIS — H81.8X9 PERSISTENT POSTURAL-PERCEPTUAL DIZZINESS: ICD-10-CM

## 2024-11-22 RX ORDER — LIDOCAINE 50 MG/G
PATCH TOPICAL
COMMUNITY
Start: 2024-10-28

## 2024-11-22 RX ORDER — IBUPROFEN 800 MG/1
800 TABLET, FILM COATED ORAL EVERY 8 HOURS PRN
COMMUNITY
Start: 2024-11-01

## 2024-11-22 RX ORDER — ATOGEPANT 60 MG/1
60 TABLET ORAL DAILY
Qty: 30 TABLET | Refills: 5 | Status: SHIPPED | OUTPATIENT
Start: 2024-11-22

## 2024-11-22 RX ORDER — ONDANSETRON 8 MG/1
8 TABLET, FILM COATED ORAL EVERY 12 HOURS PRN
COMMUNITY

## 2024-11-22 NOTE — OUTREACH NOTE
AMBULATORY CASE MANAGEMENT NOTE    Names and Relationships of Patient/Support Persons: Contact: Kimberly Dawson; Relationship: Self -     Patient Outreach  RN-ACM outreach with patient. Patient states to be compliant with medications and medical appointments. Patient states to have 11/22/24 neurology appointment. Patient states to have difficulty with balance' using cane; and has had no difficulty with falls. Patient voiced intent to obtain rollator and states to have received referral for OT services and will discuss with physician.  Patient states to continue with episodes of nausea regarding smells; tinnitus and bright lights to eyes. Patient states to take Zofran as needed and able to tolerate food and fluids. Patient states to be eating diet food that is air fried; grilled and no carbohydrates. Patient states to have difficulty with hearing and states to have had audiology appointment and recommendations. She is awaiting speciality hearing aids. Patient states to have difficulty sleeping and no difficulty with chest pain or SOB. Reviewed with patient education and verbalized understanding. Patient states to appreciate outreach and requests additional outreach. No further questions voiced at this time.       Education Documentation  Unresolved/Worsening Symptoms, taught by Deandra Lopez RN at 11/22/2024 12:41 PM.  Learner: Patient  Readiness: Acceptance  Method: Explanation  Response: Verbalizes Understanding    Diet Modification, taught by Deandra Lopez RN at 11/22/2024 12:41 PM.  Learner: Patient  Readiness: Acceptance  Method: Explanation  Response: Verbalizes Understanding    Avoidance of Triggers, taught by Deandra Lopez RN at 11/22/2024 12:41 PM.  Learner: Patient  Readiness: Acceptance  Method: Explanation  Response: Verbalizes Understanding    When to Seek Medical Attention, taught by Deandra Lopez RN at 11/22/2024 12:40 PM.  Learner: Patient  Readiness: Acceptance  Method:  Explanation  Response: Verbalizes Understanding    Participation with Disease Management Plan, taught by Deandra Lopez, RN at 11/22/2024 12:40 PM.  Learner: Patient  Readiness: Acceptance  Method: Explanation  Response: Verbalizes Understanding    Unresolved/Worsening Symptoms, taught by Deandra Lopez, RN at 11/22/2024 12:40 PM.  Learner: Patient  Readiness: Acceptance  Method: Explanation  Response: Verbalizes Understanding    Home Safety, taught by Deandra Lopez RN at 11/22/2024 12:40 PM.  Learner: Patient  Readiness: Acceptance  Method: Explanation  Response: Verbalizes Understanding    Energy Conservation, taught by Deandra Lopez, RN at 11/22/2024 12:40 PM.  Learner: Patient  Readiness: Acceptance  Method: Explanation  Response: Verbalizes Understanding          Deandra DUARTE  Ambulatory Case Management    11/22/2024, 12:41 EST

## 2024-12-03 ENCOUNTER — SPECIALTY PHARMACY (OUTPATIENT)
Dept: INFUSION THERAPY | Facility: HOSPITAL | Age: 54
End: 2024-12-03
Payer: OTHER MISCELLANEOUS

## 2024-12-03 NOTE — PROGRESS NOTES
As of 12/3, we are still awaiting confirmation from the  that Qulipta auth can be initiated. Per Lawrence at [facility], it should be this week that we hear back. Also per Lawrence, the  denied further fills for Ubrelvy and patient will have to fill using [employer] Part D   Kurtis Martinez, [facility]  12/03/24  15:25 EST

## 2024-12-05 ENCOUNTER — SPECIALTY PHARMACY (OUTPATIENT)
Dept: INFUSION THERAPY | Facility: HOSPITAL | Age: 54
End: 2024-12-05
Payer: OTHER MISCELLANEOUS

## 2024-12-05 NOTE — PROGRESS NOTES
Specialty Pharmacy Patient Management Program  Neurology Medication Addition Assessment     Kimberly Dawson is a 54 y.o. female with migraines seen by a Neurology provider and enrolled in the Neurology Patient Management program offered by [facility].  This assessment was conducted as a result of a specialty medication addition or substitution. The patient was previously introduced to services offered by [facility], including: regular assessments, refill coordination, curbside pick-up or mail order delivery options, prior authorization maintenance, and financial assistance programs as applicable. The patient was also provided with contact information for the pharmacy team.     An initial outreach was conducted, including assessment of therapy appropriateness and specialty medication education for Qulipta 60mg.    A follow-up outreach was conducted, including assessment of continued therapy appropriateness, medication adherence, and side effect incidence and management for Ubrelvy 100mg.    Insurance Coverage & Financial Support  [employer] Part D    Relevant Past Medical History and Comorbidities  Relevant medical history and concomitant health conditions were discussed with the patient. The patient's chart has been reviewed for relevant past medical history and comorbid health conditions and updated as necessary.   Past Medical History:   Diagnosis Date    Acid reflux     Injury of back     Migraine June 1998    Neck complaint     Peripheral neuropathy May 2022    PTSD (post-traumatic stress disorder)     from elevator falling incident     Social History     Socioeconomic History    Marital status: Single   Tobacco Use    Smoking status: Never    Smokeless tobacco: Never   Vaping Use    Vaping status: Never Used   Substance and Sexual Activity    Alcohol use: Never    Drug use: Never    Sexual activity: Not Currently     Partners: Male     Comment: Hysterectomy      Problem list reviewed by Kurtis Martinez RPH on 12/5/2024 at  1:05 PM    Hospitalizations and Urgent Care Since Last Assessment  ED Visits, Admissions, or Hospitalizations: Per the patient, no recent ED visits or hospitalizations.    Urgent Office Visits: n/a - the patient was last seen on 11/22/24     Allergies  Known allergies and reactions were discussed with the patient. The patient's chart has been reviewed for  allergy information and updated as necessary.   Allergies   Allergen Reactions    Morphine Nausea And Vomiting    Amoxicillin Other (See Comments)     Amoxicillin alone 'flares up acid reflux', but she is able to take/tolerate augmentin    Codeine Nausea Only     Allergies reviewed by Kurtis Martinez RPH on 12/5/2024 at  1:05 PM    Relevant Laboratory Values  Common labs          1/18/2024    04:16 4/24/2024    10:41   Common Labs   Glucose 102  66    BUN 8  13    Creatinine 0.85  0.82    Sodium 140  141    Potassium 3.6  3.7    Chloride 105  106    Calcium 9.2  9.4    Albumin 3.9  3.9    Total Bilirubin 0.7  0.5    Alkaline Phosphatase 58  65    AST (SGOT) 16  18    ALT (SGPT) 13  15    WBC 4.10  3.19    Hemoglobin 11.7  11.2    Hematocrit 36.6  37.0    Platelets 321  291        Lab Assessment  There are no recent or up to date laboratory values in Epic to assess. There are no required ongoing laboratory monitoring parameters for this drug. The patient has been encouraged to obtain a baseline set of labs for the 2025 calendar year for their own health records.       Current Medication List  This medication list has been reviewed with the patient and evaluated for any interactions or necessary modifications/recommendations, and updated to include all prescription medications, OTC medications, and supplements the patient is currently taking.  This list reflects what is contained in the patient's profile, which has also been marked as reviewed to communicate to other  providers it is the most up to date version of the patient's current medication therapy.     Current Outpatient Medications:     albuterol (PROVENTIL) (2.5 MG/3ML) 0.083% nebulizer solution, albuterol sulfate 2.5 mg/3 mL (0.083 %) solution for nebulization, Disp: , Rfl:     amphetamine-dextroamphetamine (ADDERALL) 10 MG tablet, , Disp: , Rfl:     Atogepant (Qulipta) 60 MG tablet, Take 1 tablet by mouth Daily., Disp: 30 tablet, Rfl: 5    DULoxetine (CYMBALTA) 60 MG capsule, 30 mg, Disp: , Rfl:     EPINEPHrine (EPIPEN) 0.3 MG/0.3ML solution auto-injector injection, 0.3 mL Daily., Disp: , Rfl:     HYDROcodone-acetaminophen (NORCO)  MG per tablet, , Disp: , Rfl:     ibuprofen (ADVIL,MOTRIN) 800 MG tablet, Take 1 tablet by mouth Every 8 (Eight) Hours As Needed. for pain, Disp: , Rfl:     lidocaine (LIDODERM) 5 %, APPLY 1 PATCH TOPICALLY TO THE SKIN DAILY. MAY WEAR UP TO 12 HOURS, Disp: , Rfl:     ondansetron (ZOFRAN) 8 MG tablet, Take 1 tablet by mouth Every 12 (Twelve) Hours As Needed for Nausea or Vomiting., Disp: , Rfl:     tiZANidine (Zanaflex) 4 MG tablet, Every 12 (Twelve) Hours., Disp: , Rfl:     topiramate (Topamax) 50 MG tablet, Wk 1: 50 mg QAM and 100 mg QHS PO Wk 2: 100 mg BID PO (Patient taking differently: Take 2 tablets by mouth Daily. 1 at night), Disp: 120 tablet, Rfl: 2    ubrogepant (Ubrelvy) 100 MG tablet, Take 1 tablet by mouth As Needed at Onset of Migraine. May repeat 2 hours later if needed. Max dose 200mg in 24 hours., Disp: 30 tablet, Rfl: 5    Medicines reviewed by Kurtis Martinez [facility] on 12/5/2024 at  1:05 PM    Drug Interactions  The patient's medication profile has been reviewed for accuracy and assessed for interactions. There are currently no significant interactions to address at this time, however, the patient has been encouraged to notify our office if they plan to start any new medications or supplements.      Initial Education Provided for Specialty Medication  The  patient has been provided with the following education and any applicable administration techniques (i.e. self-injection) have been demonstrated for the therapies indicated. All questions and concerns have been addressed prior to the patient receiving the medication, and the patient has verbalized comprehension of the education and any materials provided.  Additional patient education shall be provided and documented upon request by the patient, provider or payer.      Atogepant (Qulipta) 60 mg tablet, take 1 tablet by mouth daily       Medication Expectations   Why am I taking this medication? You are taking this medication for migraine prophylaxis.   What should I expect while on this medication? You should expect to see a decrease in the frequency and severity of your migraines.   How does the medication work? Atogepant is a small molecule that binds to calcitonin gene-related peptide (CGRP) and blocks its binding to the receptor decreasing the severity of migraines.   How long will I be on this medication for? The amount of time you will be on this medication will be determined by your doctor and your response to the medication.    How do I take this medication? Take as directed on your prescription label. Take one tablet daily with or without food.   What are some possible side effects? Potential side effects including, but not limited to nausea, constipation and fatigue. Patient verbalized understanding.   What happens if I miss a dose? If you miss a dose of this medicine, take it as soon as possible. However, if it is almost time for your next dose, skip the missed dose and go back to your regular dosing schedule. Do not double doses.          Medication Safety   What are things I should warn my doctor immediately about? Hypersensitivity reactions, such as trouble breathing or swallowing.   What are things that I should be cautious of? Be cautious driving until you know how this drug will affect you.   What  are some medications that can interact with this one? Ketoconazole, Itraconazole, cyclosporin, clarithromycin, rifampin, carbamazepine, phenytion, [place]'s Wort, efavirenz, and etravine.           Medication Storage/Handling   How should I handle this medication? Keep this medication our of reach of pets/children in original container.   How does this medication need to be stored? Store at room temperature away from heat/cold, sunlight or moisture.   How should I dispose of this medication? There should not be a need to dispose of this medication unless your provider decides to change the dose or therapy. If that is the case, take to your local police station for proper disposal. Some pharmacies also have take-back bins for medication drop-off.            Resources/Support   How can I remind myself to take this medication? You can download reminder apps to help you manage your refills. You may also set an alarm on your phone to remind you. The pharmacy carries pill boxes that you can place next to an area you pass everyday (such as where you place your car keys or where you charge your phone).   Is financial support available?  Yes, [employer] can provide co-pay cards if you have commercial insurance or patient assistance if you have Medicare or no insurance.    Which vaccines are recommended for me? Talk to your doctor about these vaccines: Flu, Coronavirus (COVID-19), Pneumococcal (pneumonia), Tdap, Hepatitis B, Zoster (shingles).        Adherence, Self-Administration, and Current Therapy Problems  Adherence related to the patient's specialty therapy was discussed with the patient. The Adherence segment of this outreach has been reviewed and updated.     Is there a concern with patient's ability to self administer the medication correctly and without issue?: No  Were any potential barriers to adherence identified during the initial assessment or patient education?: No  Are there any concerns regarding the patient's  understanding of the importance of medication adherence?: No    Adherence Questions  Linked Medication(s) Assessed: Ubrogepant (UBRELVY)  On average, how many doses/injections does the patient miss per month?: 0  What are the identified reasons for non-adherence or missed doses? : no problems identified  What is the estimated medication adherence level?: %  Based on the patient/caregiver response and refill history, does this patient require an MTP to track adherence improvements?: no    Additional Barriers to Patient Self-Administration: There are no barriers to administration for Ubrelvy and she is able to take in a timely manner. I have counseled the patient to take her Qulipta every night before bed.   Methods for Supporting Patient Self-Administration: n/a    Recently Close Medication Therapy Problems  No medication therapy recommendations to display  Open Medication Therapy Problems  No medication therapy recommendations to display     Adverse Drug Reactions  Medication tolerability: Tolerating with no to minimal ADRs          Medication plan: Continue therapy with normal follow-up  Plan for ADR Management: Per the patient, there are no reportable adverse events related to Ubrelvy.     Goals of Therapy  Goals related to the patient's specialty therapy were discussed with the patient. The Patient Goals segment of this outreach has been reviewed and updated.   Goals Addressed Today        Specialty Pharmacy General Goal      8/27/24 - Patient is currently experiencing post concussion headaches associated with a recent  head injury at work. She is not experiencing typical migraine symptoms and her headaches are consistent for the most part. Her average symptom score is 6-7/10 and she is hoping for Ubrelvy to reduce her symptoms by at least 50% within an hour of her dose so that she can continue to work and perform her normal ADLs.  She does not expect Ubrelvy to completely alleviate her symptoms but would  be very pleased if she can have a headache free day.   12/5/24 - The patient is still having daily headaches and will therefore start the patient on Qulipta 60mg daily. She is hopeful that Qulipta can cut her headache days in half and reduce her average symptom score to 4/10.  Ubrelvy has been able to reduce symptoms by 50-75% within an hour of her first dose and she hopes to maintain this response.                Quality of Life Assessment   Quality of Life related to the patient's enrollment in the patient management program and services provided was discussed with the patient. The QOL segment of this outreach has been reviewed and updated.   Quality of Life Improvement Scale: 8-Moderately better    Reassessment Plan & Follow-Up  Medication Therapy Changes: Per Yue De La Fuente, the patient is to start Qulipta 60mg once daily for headache prevention. She is to continue Ubrelvy 100mg as needed for abortive care.   Related Plans, Therapy Recommendations, or Issues to Be Addressed: Patient has been advised that she should see quick results with Qulipta but in some cases may take several weeks. We also reviewed the risk of constipation as well as proper diet and hydration to mitigate this risk.    Pharmacist to perform regular reassessments no more than (6) months from the previous assessment.  Care Coordinator to set up future refill outreaches, coordinate prescription delivery, and escalate clinical questions to pharmacist.     Attestation  Therapeutic appropriateness: Appropriate  I attest the patient was actively involved in and has agreed to the above plan of care. If the prescribed therapy is at any point deemed not appropriate based on the current or future assessments, a consultation will be initiated with the patient's specialty care provider to determine the best course of action. The revised plan of therapy will be documented along with any additional patient education provided. Discussed aforementioned  material with patient via telemedicine.    Pavan Martinez, PharmD  Clinic Specialty Pharmacist, Neurology  12/5/2024  13:08 EST

## 2024-12-05 NOTE — PROGRESS NOTES
Specialty Pharmacy Refill Coordination Note     Kimberly is a 54 y.o. female contacted today regarding refills of her specialty medication(s).    Specialty medication(s) and dose(s) confirmed: Qulipita 60mg and Ubrelvy 100mg  Changes to medications: no  Changes to insurance: no  Reviewed and verified with patient:  Allergies  Meds  Problems         Refill Questions      Flowsheet Row Most Recent Value   Changes to allergies? No   Changes to medications? No   New conditions or infections since last clinic visit No   Unplanned office visit, urgent care, ED, or hospital admission in the last 4 weeks  No   How does patient/caregiver feel medication is working? Very good   Financial problems or insurance changes  No   Since the previous refill, were any specialty medication doses or scheduled injections missed or delayed?  No   Does this patient require a clinical escalation to a pharmacist? No            Delivery Questions      Flowsheet Row Most Recent Value   Delivery method UPS   Delivery address verified with patient/caregiver? Yes   Delivery address Home   Number of medications in delivery 2   Medication(s) being filled and delivered Ubrogepant (UBRELVY), Atogepant (Qulipta)   Doses left of specialty medications new start qulipta - 0 ubrelvy   Copay verified? Yes   Copay amount $22.40   Copay form of payment Credit/debit on file   Ship Date 12/5/24   Delivery Date 12/6/24   Signature Required No                 Follow-up: 25 day(s)     Kurtis Martinez [facility]  12/5/2024   13:23 EST

## 2025-01-02 ENCOUNTER — SPECIALTY PHARMACY (OUTPATIENT)
Dept: INFUSION THERAPY | Facility: HOSPITAL | Age: 55
End: 2025-01-02
Payer: MEDICARE

## 2025-01-07 ENCOUNTER — SPECIALTY PHARMACY (OUTPATIENT)
Dept: INFUSION THERAPY | Facility: HOSPITAL | Age: 55
End: 2025-01-07
Payer: MEDICARE

## 2025-01-15 ENCOUNTER — PATIENT OUTREACH (OUTPATIENT)
Dept: CASE MANAGEMENT | Facility: OTHER | Age: 55
End: 2025-01-15
Payer: MEDICARE

## 2025-01-15 ENCOUNTER — SPECIALTY PHARMACY (OUTPATIENT)
Dept: INFUSION THERAPY | Facility: HOSPITAL | Age: 55
End: 2025-01-15
Payer: MEDICARE

## 2025-01-15 NOTE — PROGRESS NOTES
Made OBC to [facility] to obtain update on Qulipta authorization. Per Torito, rep, it is still pending and they are awaiting a response from the .  I called the patient to inform her of the status and that we would call to scheduled delivery when we get an approval. I also advised her that if it is denied, I did obtain an approval through her [employer] Part D.   Kurtis Martinez, [facility]  01/15/25  15:32 EST

## 2025-01-15 NOTE — OUTREACH NOTE
AMBULATORY CASE MANAGEMENT NOTE    Names and Relationships of Patient/Support Persons: Contact: Kimberly Dawson; Relationship: Self -     Patient Outreach  RN-ACM outreach with patient. Patient states improvement regarding cough and no difficulty with fever; chest pain; SOB but does have decrease in appetite due to episodes of nausea and difficulty sleeping. Patient states to be tolerating fluids. Patient states to be compliant with medications and receiving assistance from [facility] regarding medication authorization. Patient states to be waiting for approval from insurance ([employer]) regarding hearing aids. Patient states to continue with headaches; ambulating with cane and no difficulty with falls. Reviewed with patient education ; RN-ACM contact information ; 24/7 Nurse Line Telephone number and patient verbalized understanding. Patient states to have appreciate outreach. Patient states to have assistance from family as needed. Patient exhibits strong sense of health self-management and adequate support system. No further questions voiced at this time.      Education Documentation  Energy Conservation, taught by Deandra Lopez RN at 1/15/2025 12:54 PM.  Learner: Patient  Readiness: Acceptance  Method: Explanation  Response: Verbalizes Understanding    Assistive/Adaptive Devices, taught by Deandra Lopez RN at 1/15/2025 12:54 PM.  Learner: Patient  Readiness: Acceptance  Method: Explanation  Response: Verbalizes Understanding    Unresolved/Worsening Symptoms, taught by Deandra Lopez RN at 1/15/2025 12:54 PM.  Learner: Patient  Readiness: Acceptance  Method: Explanation  Response: Verbalizes Understanding    When to Seek Medical Attention, taught by Deandra Lopez RN at 1/15/2025 12:54 PM.  Learner: Patient  Readiness: Acceptance  Method: Explanation  Response: Verbalizes Understanding    Participation with Disease Management Plan, taught by Deandra Lopez RN at 1/15/2025 12:54  PM.  Learner: Patient  Readiness: Acceptance  Method: Explanation  Response: Verbalizes Understanding    Energy Conservation, taught by Deandra Lopez, RN at 1/15/2025 12:54 PM.  Learner: Patient  Readiness: Acceptance  Method: Explanation  Response: Verbalizes Understanding          Deandra DUARTE  Ambulatory Case Management    1/15/2025, 12:54 EST

## 2025-01-16 ENCOUNTER — SPECIALTY PHARMACY (OUTPATIENT)
Dept: INFUSION THERAPY | Facility: HOSPITAL | Age: 55
End: 2025-01-16
Payer: MEDICARE

## 2025-01-16 ENCOUNTER — DOCUMENTATION (OUTPATIENT)
Dept: NEUROLOGY | Facility: CLINIC | Age: 55
End: 2025-01-16
Payer: MEDICARE

## 2025-01-16 DIAGNOSIS — G43.011 INTRACTABLE MIGRAINE WITHOUT AURA AND WITH STATUS MIGRAINOSUS: ICD-10-CM

## 2025-01-16 NOTE — PROGRESS NOTES
Specialty Pharmacy Refill Coordination Note     Kimberly is a 54 y.o. female contacted today regarding refills of  1 specialty medication(s).    Reviewed and verified with patient:       Specialty medication(s) and dose(s) confirmed: yes    Refill Questions      Flowsheet Row Most Recent Value   Changes to allergies? No   New conditions or infections since last clinic visit No   Unplanned office visit, urgent care, ED, or hospital admission in the last 4 weeks  No   How does patient/caregiver feel medication is working? Very good   Financial problems or insurance changes  No   Since the previous refill, were any specialty medication doses or scheduled injections missed or delayed?  No   Does this patient require a clinical escalation to a pharmacist? No            Delivery Questions      Flowsheet Row Most Recent Value   Delivery method UPS   Delivery address verified with patient/caregiver? Yes   Delivery address Home   Number of medications in delivery 1   Medication(s) being filled and delivered Atogepant (Qulipta)   Doses left of specialty medications 0   Copay verified? Yes   Copay amount $0   Copay form of payment No copayment ($0)   Ship Date 1/17/25   Delivery Date 1/18/25   Signature Required No                   Follow-up: 25  day(s)     Sheron Thompson  Specialty Pharmacy Technician      As of 12/3, we are still awaiting confirmation from the  that Qulipta auth can be initiated. Per Lawrence at [facility], it should be this week that we hear back. Also per Lawrence, the  denied further fills for Ubrelvy and patient will have to fill using [employer] Part D   Kurtis Martinez, [facility]  12/03/24  15:25 EST

## 2025-02-04 ENCOUNTER — SPECIALTY PHARMACY (OUTPATIENT)
Dept: INFUSION THERAPY | Facility: HOSPITAL | Age: 55
End: 2025-02-04
Payer: MEDICARE

## 2025-02-10 ENCOUNTER — SPECIALTY PHARMACY (OUTPATIENT)
Dept: INFUSION THERAPY | Facility: HOSPITAL | Age: 55
End: 2025-02-10
Payer: MEDICARE

## 2025-02-10 NOTE — PROGRESS NOTES
Specialty Pharmacy Refill Coordination Note     Kimberly is a 54 y.o. female contacted today regarding refills of her specialty medication(s).    Specialty medication(s) and dose(s) confirmed: Qulipta 60mg  Changes to medications: no  Changes to insurance: no  Reviewed and verified with patient:         Refill Questions      Flowsheet Row Most Recent Value   Changes to allergies? No   Changes to medications? No   New conditions or infections since last clinic visit No   Unplanned office visit, urgent care, ED, or hospital admission in the last 4 weeks  No   How does patient/caregiver feel medication is working? Very good   Financial problems or insurance changes  No   Since the previous refill, were any specialty medication doses or scheduled injections missed or delayed?  No   Does this patient require a clinical escalation to a pharmacist? No            Delivery Questions      Flowsheet Row Most Recent Value   Delivery method UPS   Delivery address verified with patient/caregiver? Yes   Delivery address Home   Number of medications in delivery 1   Medication(s) being filled and delivered Atogepant (Qulipta)   Doses left of specialty medications 5 tabs   Copay verified? Yes   Copay amount $0   Copay form of payment No copayment ($0)   Ship Date 2/12/25   Delivery Date Selection 02/13/25   Signature Required No                 Follow-up: 25 day(s)     Kurtis Martinez [facility]  2/10/2025   15:51 EST

## 2025-03-11 ENCOUNTER — SPECIALTY PHARMACY (OUTPATIENT)
Dept: INFUSION THERAPY | Facility: HOSPITAL | Age: 55
End: 2025-03-11
Payer: MEDICARE

## 2025-03-11 NOTE — PROGRESS NOTES
Specialty Pharmacy Patient Management Program  Refill Outreach     Kimberly was contacted today regarding refills of their medication(s).    Refill Questions      Flowsheet Row Most Recent Value   Changes to allergies? No   Changes to medications? No   New conditions or infections since last clinic visit No   Unplanned office visit, urgent care, ED, or hospital admission in the last 4 weeks  No   How does patient/caregiver feel medication is working? Very good   Financial problems or insurance changes  No   Since the previous refill, were any specialty medication doses or scheduled injections missed or delayed?  No   Does this patient require a clinical escalation to a pharmacist? No            Delivery Questions      Flowsheet Row Most Recent Value   Delivery method UPS   Delivery address verified with patient/caregiver? Yes   Delivery address Home   Other address preferred NA   Number of medications in delivery 1   Medication(s) being filled and delivered Atogepant (Qulipta)   Doses left of specialty medications 4 tabs   Copay verified? Yes   Copay amount $0   Copay form of payment No copayment ($0)   Delivery Date Selection 03/12/25   Signature Required No                 Follow-up: 25 day(s)     Sheron Thompson  3/11/2025  10:56 EDT

## 2025-03-31 ENCOUNTER — SPECIALTY PHARMACY (OUTPATIENT)
Dept: INFUSION THERAPY | Facility: HOSPITAL | Age: 55
End: 2025-03-31

## 2025-04-10 ENCOUNTER — SPECIALTY PHARMACY (OUTPATIENT)
Dept: PHARMACY | Facility: TELEHEALTH | Age: 55
End: 2025-04-10

## 2025-04-10 NOTE — PROGRESS NOTES
Specialty Pharmacy Patient Management Program  Refill Outreach     Kimberly was contacted today regarding refills of their medication(s).    Refill Questions      Flowsheet Row Most Recent Value   Changes to allergies? No   Changes to medications? No   New conditions or infections since last clinic visit No   Unplanned office visit, urgent care, ED, or hospital admission in the last 4 weeks  No   How does patient/caregiver feel medication is working? Good   Financial problems or insurance changes  No   Since the previous refill, were any specialty medication doses or scheduled injections missed or delayed?  No   Does this patient require a clinical escalation to a pharmacist? No            Delivery Questions      Flowsheet Row Most Recent Value   Delivery method UPS   Delivery address verified with patient/caregiver? Yes   Delivery address Home   Other address preferred NA   Number of medications in delivery 1   Medication(s) being filled and delivered Atogepant (Qulipta)   Doses left of specialty medications no missed doses   Copay verified? Yes   Copay amount $0   Copay form of payment No copayment ($0)   Delivery Date Selection 04/11/25   Signature Required No   Do you consent to receive electronic handouts?  No                 Follow-up: 21 day(s)     Mirna Jj, Pharmacy Technician  4/10/2025  10:49 EDT

## 2025-04-17 NOTE — PROGRESS NOTES
Chief Complaint  Migraine, Dizziness, and Concussion    Subjective          Kimberly Dawson presents to [facility] for MIGRAINE  History of Present Illness     Malachi Dawson is seen today in follow-up for postconcussive syndrome with headache and continued dizziness after a fall at work on 4/24/2024.  Patient had been started on topiramate up to 100 twice a day, but at last visit she decreased the dose to 100 mg nightly because she did not notice an improvement in her migraines.  Patient tried Ubrelvy when having a migraine after eating Chinese food which was very effective in treating acute migraine.  Patient was started on Ubrelvy daily, but this was not covered by her Worker's Comp.    Patient reports that despite topiramate 50 mg daily and Qulipta 60 mg every day, she continues to have a migraine every single day.        Follow up on migraine  Medication:/ quilipta  Duration-headache is every day--topiramate doesn't take it away  Change in migraine-worse  Frequency- Every day      11- Yue [facility] Office Visit===============================================================  Assessment and Plan    Diagnoses and all orders for this visit:     1. Intractable migraine without aura and with status migrainosus (Primary)  -     Atogepant (Qulipta) 60 MG tablet; Take 1 tablet by mouth Daily.  Dispense: 30 tablet; Refill: 5     2. Persistent postural-perceptual dizziness     3. New daily persistent headache     4. Post concussion syndrome        Kimberly Dawson is seen today for postconcussive syndrome with chronic daily headache and persistent dizziness.  She has migraine with light and sound sensitivity.  Ubrelvy helped ease migraine.  She continues on topiramate 100 mg daily, but did not find benefit with the 100 mg twice a day.     Will start Qulipta 60 mg daily to see if we can reduce her daily headache/migraine.  I provided her 4 samples of Qulipta (LOT [id number], exp 9/26).      Continue Ubrelvy as needed     I provided her name and phone number of a therapist.     Continue to work with Dr. Garcia.     Follow-up 4 months    Current Outpatient Medications:     albuterol (PROVENTIL) (2.5 MG/3ML) 0.083% nebulizer solution, albuterol sulfate 2.5 mg/3 mL (0.083 %) solution for nebulization, Disp: , Rfl:     amphetamine-dextroamphetamine (ADDERALL) 10 MG tablet, , Disp: , Rfl:     Atogepant (Qulipta) 60 MG tablet, Take 1 tablet by mouth Daily., Disp: 30 tablet, Rfl: 5    dexlansoprazole (DEXILANT) 60 MG capsule, Take 1 capsule by mouth Daily., Disp: , Rfl:     DULoxetine (CYMBALTA) 60 MG capsule, 30 mg, Disp: , Rfl:     EPINEPHrine (EPIPEN) 0.3 MG/0.3ML solution auto-injector injection, 0.3 mL Daily., Disp: , Rfl:     HYDROcodone-acetaminophen (NORCO)  MG per tablet, , Disp: , Rfl:     ibuprofen (ADVIL,MOTRIN) 800 MG tablet, Take 1 tablet by mouth Every 8 (Eight) Hours As Needed. for pain, Disp: , Rfl:     lamoTRIgine (LaMICtal) 100 MG tablet, Take 1 tablet by mouth Daily., Disp: , Rfl:     lamoTRIgine (LaMICtal) 25 MG tablet, Take 1 tablet by mouth Daily., Disp: , Rfl:     lidocaine (LIDODERM) 5 %, APPLY 1 PATCH TOPICALLY TO THE SKIN DAILY. MAY WEAR UP TO 12 HOURS, Disp: , Rfl:     metoclopramide (REGLAN) 5 MG tablet, TAKE 1 TABLET BY MOUTH FOUR TIMES DAILY AS NEEDED FOR NAUSEA, Disp: , Rfl:     nystatin (MYCOSTATIN) 100,000 unit/mL suspension, TAKE 5 ML BY MOUTH FOUR TIMES DAILY SWISH AND SPIT, Disp: , Rfl:     ondansetron (ZOFRAN) 8 MG tablet, Take 1 tablet by mouth Every 12 (Twelve) Hours As Needed for Nausea or Vomiting., Disp: , Rfl:     QUEtiapine (SEROquel) 25 MG tablet, Take 1 tablet by mouth Every Night., Disp: , Rfl:     tiZANidine (Zanaflex) 4 MG tablet, Every 12 (Twelve) Hours., Disp: , Rfl:     topiramate (Topamax) 50 MG tablet, Wk 1: 50 mg QAM and 100 mg QHS PO Wk 2: 100 mg BID PO (Patient taking differently: Take 2 tablets by mouth Daily. 1 at night), Disp: 120 tablet, Rfl:  "2    ubrogepant (Ubrelvy) 100 MG tablet, Take 1 tablet by mouth As Needed at Onset of Migraine. May repeat 2 hours later if needed. Max dose 200mg in 24 hours., Disp: 30 tablet, Rfl: 5    Review of Systems   Neurological:  Positive for dizziness, light-headedness and headaches.   All other systems reviewed and are negative.         Objective:    Vital Signs:   /86   Pulse 87   Ht 166.4 cm (65.5\")   Wt 105 kg (232 lb)   BMI 38.02 kg/m²     Physical Exam  Constitutional:       Appearance: She is well-developed.   HENT:      Head: Normocephalic and atraumatic.      Right Ear: Decreased hearing noted.      Left Ear: Decreased hearing noted.   Eyes:      General: Lids are normal.      Extraocular Movements: Extraocular movements intact.      Pupils: Pupils are equal, round, and reactive to light.   Pulmonary:      Effort: Pulmonary effort is normal.   Musculoskeletal:      Cervical back: Normal range of motion and neck supple.   Skin:     General: Skin is warm and dry.   Neurological:      Mental Status: She is oriented to person, place, and time.      Cranial Nerves: Cranial nerve deficit present.      Motor: Motor function is intact. No tremor.      Coordination: Coordination is intact.      Gait: Gait is intact.      Comments: Hearing loss   Psychiatric:         Attention and Perception: Attention normal.         Mood and Affect: Mood normal.         Speech: Speech normal.         Behavior: Behavior normal.         Cognition and Memory: Cognition and memory normal.         Judgment: Judgment normal.        Result Review :                Neurological Exam  Mental Status  Awake, alert and oriented to person, place and time. Oriented to person, place, and time. Memory is normal. Speech is normal.    Cranial Nerves  CN II: Visual acuity is normal. Visual fields full to confrontation.  CN III, IV, VI: Extraocular movements intact bilaterally. Normal lids and orbits bilaterally. Pupils equal round and reactive to " light bilaterally.  CN V: Facial sensation is normal.  CN VII: Full and symmetric facial movement.  CN IX, X: Palate elevates symmetrically. Normal gag reflex.  CN XI: Shoulder shrug strength is normal.  CN XII: Tongue midline without atrophy or fasciculations.    Motor   No abnormal involuntary movements.    Sensory  Light touch is normal in upper and lower extremities.     Coordination    Finger-to-nose, rapid alternating movements and heel-to-shin normal bilaterally without dysmetria.No tremor    Gait   Normal gait. Able to rise from chair without using arms.         Assessment and Plan    Diagnoses and all orders for this visit:    1. Post concussion syndrome (Primary)  -     Ambulatory Referral to Neurology    2. Intractable migraine without aura and with status migrainosus  -     ubrogepant (Ubrelvy) 100 MG tablet; Take 1 tablet by mouth As Needed at Onset of Migraine. May repeat 2 hours later if needed. Max dose 200mg in 24 hours.  Dispense: 30 tablet; Refill: 5  -     Ambulatory Referral to Neurology    3. Persistent postural-perceptual dizziness  -     Ambulatory Referral to Neurology      Malachi Dawson returns today for post concussion syndrome with intractable chronic daily migraine and dizziness.  Tried  topiramate, duloxetine, and Qulipta without much benefit.  She is unable to take nortriptyline or amitriptyline because she is already on duloxetine and quetiapine along with lamotrigine which can increase her risk for serotonin syndrome.  She is unable to take a beta-blocker because she is on allergy injections and this can reduce efficacy of epinephrine.   We discussed today starting Botox, but the patient is very hesitant about the injections.    I would like for her to wean off topiramate by going 50 mg every other day and then stopping this medication.    Continue Qulipta 60 mg daily.    Will order Ubrelvy 100 mg for acute migraine.  Patient states that this medication is the only thing that helped  reduce her headaches.  She is willing to use her private insurance to pay for this.    Patient does not seem to have any improvement with any medications that we've tried, I am going to refer the patient to [place] [facility].    She will follow-up in 3 to 4 months, but can cancel this appointment if she gets established with a migraine specialist.    Medications currently on: Topiramate, Qulipta  Medications tried in past: Topiramate 100 mg twice a day, duloxetine, Nurtec       Follow Up   Return for 3-4 months .  Patient was given instructions and counseling regarding her condition or for health maintenance advice. Please see specific information pulled into the AVS if appropriate.     This document has been electronically signed by HELEN Mast  on April 18, 2025 15:41 EDT

## 2025-04-18 ENCOUNTER — OFFICE VISIT (OUTPATIENT)
Dept: NEUROLOGY | Facility: CLINIC | Age: 55
End: 2025-04-18
Payer: MEDICARE

## 2025-04-18 VITALS
SYSTOLIC BLOOD PRESSURE: 136 MMHG | WEIGHT: 232 LBS | HEIGHT: 66 IN | HEART RATE: 87 BPM | DIASTOLIC BLOOD PRESSURE: 86 MMHG | BODY MASS INDEX: 37.28 KG/M2

## 2025-04-18 DIAGNOSIS — G43.011 INTRACTABLE MIGRAINE WITHOUT AURA AND WITH STATUS MIGRAINOSUS: ICD-10-CM

## 2025-04-18 DIAGNOSIS — H81.8X9 PERSISTENT POSTURAL-PERCEPTUAL DIZZINESS: ICD-10-CM

## 2025-04-18 DIAGNOSIS — F07.81 POST CONCUSSION SYNDROME: Primary | ICD-10-CM

## 2025-04-18 RX ORDER — LAMOTRIGINE 100 MG/1
100 TABLET ORAL DAILY
COMMUNITY

## 2025-04-18 RX ORDER — DEXLANSOPRAZOLE 60 MG/1
60 CAPSULE, DELAYED RELEASE ORAL DAILY
COMMUNITY
Start: 2024-11-15 | End: 2025-11-15

## 2025-04-18 RX ORDER — QUETIAPINE FUMARATE 25 MG/1
1 TABLET, FILM COATED ORAL NIGHTLY
COMMUNITY

## 2025-04-18 RX ORDER — METOCLOPRAMIDE 5 MG/1
TABLET ORAL
COMMUNITY

## 2025-04-18 RX ORDER — LAMOTRIGINE 25 MG/1
1 TABLET ORAL DAILY
COMMUNITY
Start: 2025-01-23

## 2025-04-18 RX ORDER — NYSTATIN 100000 [USP'U]/ML
SUSPENSION ORAL
COMMUNITY

## 2025-05-29 ENCOUNTER — SPECIALTY PHARMACY (OUTPATIENT)
Dept: INFUSION THERAPY | Facility: HOSPITAL | Age: 55
End: 2025-05-29

## 2025-05-29 RX ORDER — ATOGEPANT 60 MG/1
60 TABLET ORAL NIGHTLY
Qty: 30 TABLET | Refills: 5 | Status: SHIPPED | OUTPATIENT
Start: 2025-05-29